# Patient Record
Sex: MALE | Race: WHITE
[De-identification: names, ages, dates, MRNs, and addresses within clinical notes are randomized per-mention and may not be internally consistent; named-entity substitution may affect disease eponyms.]

---

## 2017-04-20 ENCOUNTER — HOSPITAL ENCOUNTER (INPATIENT)
Dept: HOSPITAL 74 - YASAS | Age: 52
LOS: 5 days | Discharge: OTHER INSTITUTION | End: 2017-04-25
Attending: INTERNAL MEDICINE | Admitting: INTERNAL MEDICINE
Payer: COMMERCIAL

## 2017-04-20 VITALS — BODY MASS INDEX: 29.5 KG/M2

## 2017-04-20 DIAGNOSIS — I10: ICD-10-CM
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
## 2017-04-20 DIAGNOSIS — F10.230: Primary | ICD-10-CM

## 2017-04-20 DIAGNOSIS — Z79.84: ICD-10-CM

## 2017-04-20 DIAGNOSIS — E11.9: ICD-10-CM

## 2017-04-20 DIAGNOSIS — S60.511A: ICD-10-CM

## 2017-04-20 DIAGNOSIS — S80.211A: ICD-10-CM
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
## 2017-04-20 RX ADMIN — BACITRACIN ZINC SCH APPLIC: 500 OINTMENT TOPICAL at 22:53

## 2017-04-20 NOTE — HP
CIWA Score





- CIWA Score


Nausea/Vomitin-Mild Nausea/No Vomiting


Muscle Tremors: 3


Anxiety: 4-Mod. Anxious/Guarded


Agitation: 4-Moderately Restless


Paroxysmal Sweats: 2


Orientation: 1-Uncertain about Date


Tacttile Disturbances: 2-Mild Itch/Numbness/Burn (B/L FINDERTIPS AND FEET)


Auditory Disturbances: 1-Very Mild


Visual Disturbances: 1-Very Mild Sensitivity


Headache: 2-Mild


CIWA-Ar Total Score: 21





Admission ROS S





- HPI


Chief Complaint: 


WITHDRAWAL SYMPTOMS 


Allergies/Adverse Reactions: 


 Allergies











Allergy/AdvReac Type Severity Reaction Status Date / Time


 


No Known Allergies Allergy   Verified 17 21:57











History of Present Illness: 


51 Y.O. WITH AN EXTENSIVE HISTORY OF ALCOHOL DEPENDENCE IS SEEKING DETOX.  HE 

WAS LAST HERE FOR DETOX IN 2015.  HE REPORTS HAVING A FOUR YEAR PERIOD OF 

SOBRIETY.   


Exam Limitations: Intoxication, Language Barrier





- Ebola screening


Have you traveled outside of the country in the last 21 days: No


Have you had contact with anyone from an Ebola affected area: No


Do you have a fever: No





- Review of Systems


Constitutional: Loss of Appetite, Night Sweats, Changes in sleep, Unexplained 

wgt Loss


EENT: reports: Blurred Vision, Tearing


Respiratory: reports: Shortness of Breath


Cardiac: reports: Chest Pain


GI: reports: No Symptoms Reported


: reports: Frequency


Musculoskeletal: reports: Back Pain, Muscle Pain, Joint Stiffness


Integumentary: reports: Bruising (ABRASIONS TO RIGHT HAND AND RIGHT KNEE AND 

BRUISE TO LEFT KNEE)


Neuro: reports: Headache, Tingling, Tremors, Unsteady Gait


Endocrine: reports: No Symptoms Reported


Hematology: reports: Anemia


Psychiatric: reports: Mood/Affect Appropiate


Other Systems: Reviewed and Negative





Patient History





- Patient Medical History


Hx Anemia: No


Hx Asthma: No


Hx Chronic Obstructive Pulmonary Disease (COPD): No


Hx Cancer: No


Hx Cardiac Disorders: No


Hx Congestive Heart Failure: No


Hx Hypertension: Yes (no meds)


Hx Hypercholesterolemia: No


Hx Pacemaker: No


HX Cerebrovascular Accident: No


Hx Seizures: No


Hx Dementia: No


Hx Diabetes: Yes (currently not on treatment)


Hx Gastrointestinal Disorders: No


Hx Liver Disease: No


Hx Genitourinary Disorders: No


Hx Sexually Transmitted Disorders: No


Hx Renal Disease (ESRD): No


Hx Thyroid Disease: No


Hx Human Immunodeficiency Virus (HIV): No (2008- neg)


Hx Hepatitis C: No


Hx Depression: No


Hx Suicide Attempt: No


Hx Bipolar Disorder: No


Hx Schizophrenia: No





- Patient Surgical History


Past Surgical History: No


Hx Neurologic Surgery: No


Hx Cataract Extraction: No


Hx Cardiac Surgery: No


Hx Lung Surgery: No


Hx Breast Surgery: No


Hx Breast Biopsy: No


Hx Abdominal Surgery: No


Hx Appendectomy: No


Hx Cholecystectomy: No


Hx Genitourinary Surgery: No


Hx  Section: No


Hx Orthopedic Surgery: No


Hx Hysterectomy: No


Anesthesia Reaction: No





- PPD History


Previous Implant?: Yes


Documented Results: Negative w/proof


Date: 08/07/15


Results: 0 mm


PPD to be Administered?: Yes





- Reproductive History


Patient is a Female of Child Bearing Age (11 -55 yrs old): No





- Smoking Cessation


Smoking history: Never smoked


Have you smoked in the past 12 months: No


Aproximately how many cigarettes per day: 0


Cigars Per Day: 0


Hx Chewing Tobacco Use: No





- Substance & Tx. History


Hx Alcohol Use: Yes


Hx Substance Use: No


Substance Use Type: Alcohol


Hx Substance Use Treatment: Yes (DETOX AND REHAB )





- Substances Abused


  ** Alcohol


Route: Oral


Frequency: Daily


Amount used: 6 PACK OF 12OZ BEER 


Age of first use: 13


Date of Last Use: 17





Family Disease History





- Family Disease History


Family Disease History: Other: Father (ETOH DEPENDENCE )





Admission Physical Exam [facility]





- Vital Signs


Vital Signs: 


 Last Vital Signs











Temp Pulse Resp BP Pulse Ox


 


 97.5 F L  90   17   112/60    


 


 17 21:58  17 21:58  17 21:58  17 21:58   














- Physical


General Appearance: Yes: Disheveled, Alcohol on Breath, Intoxicated, Anxious


HEENTM: Yes: Normal Voice


Respiratory: Yes: Chest Non-Tender, Lungs Clear, Normal Breath Sounds, No 

Respiratory Distress, No Accessory Muscle Use


Neck: Yes: No masses,lesions,Nodules, Trachea in good position


Breast: Yes: Breast Exam Deferred


Cardiology: Yes: Regular Rhythm, Regular Rate


Abdominal: Yes: Non Tender, Flat, Soft


Genitourinary: Yes: Frequency


Back: Yes: Normal Inspection


Musculoskeletal: Yes: Joint Stiffness (RIGHT KNEE)


Extremities: Yes: Tremors


Neurological: Yes: Alert, Normal Response


Integumentary: Yes: Other


Lymphatic: Yes: Within Normal Limits





- Diagnostic


(1) DM Diabetes mellitus type 2


Current Visit: Yes   Status: Chronic





(2) HTN (hypertension)


Current Visit: Yes   Status: Chronic   





(3) Alcohol dependence with uncomplicated withdrawal


Current Visit: Yes   Status: Chronic





(4) Knee abrasion


Current Visit: Yes   Status: Acute   Qualifiers: 


     Laterality: right





(5) Hand abrasion


Current Visit: Yes   Status: Acute   Qualifiers: 


     Laterality: right








Cleared for Admission S





- Detox or Rehab


S Level of Care: Medically Managed


Detox Regimen/Protocol: Librium

## 2017-04-21 LAB
ALBUMIN SERPL-MCNC: 2.7 G/DL (ref 3.4–5)
ALP SERPL-CCNC: 325 U/L (ref 45–117)
ALT SERPL-CCNC: 50 U/L (ref 12–78)
ANION GAP SERPL CALC-SCNC: 11 MMOL/L (ref 8–16)
AST SERPL-CCNC: 141 U/L (ref 15–37)
BILIRUB SERPL-MCNC: 1 MG/DL (ref 0.2–1)
CALCIUM SERPL-MCNC: 7.8 MG/DL (ref 8.5–10.1)
CO2 SERPL-SCNC: 25 MMOL/L (ref 21–32)
COCKROFT - GAULT: 190.71
CREAT SERPL-MCNC: 0.5 MG/DL (ref 0.7–1.3)
DEPRECATED RDW RBC AUTO: 17.9 % (ref 11.9–15.9)
GLUCOSE SERPL-MCNC: 172 MG/DL (ref 74–106)
MCH RBC QN AUTO: 29.3 PG (ref 25.7–33.7)
MCHC RBC AUTO-ENTMCNC: 32.6 G/DL (ref 32–35.9)
MCV RBC: 89.9 FL (ref 80–96)
PLATELET # BLD AUTO: 36 K/MM3 (ref 134–434)
PMV BLD: 9.4 FL (ref 7.5–11.1)
PROT SERPL-MCNC: 7.7 G/DL (ref 6.4–8.2)
WBC # BLD AUTO: 5.8 K/MM3 (ref 4–10)

## 2017-04-21 RX ADMIN — METFORMIN HYDROCHLORIDE SCH MG: 500 TABLET ORAL at 17:17

## 2017-04-21 RX ADMIN — BACITRACIN ZINC SCH APPLIC: 500 OINTMENT TOPICAL at 10:06

## 2017-04-21 RX ADMIN — INSULIN ASPART SCH UNITS: 100 INJECTION, SOLUTION INTRAVENOUS; SUBCUTANEOUS at 17:17

## 2017-04-21 RX ADMIN — Medication SCH TAB: at 10:06

## 2017-04-21 RX ADMIN — Medication SCH MG: at 22:14

## 2017-04-21 RX ADMIN — INSULIN ASPART SCH UNITS: 100 INJECTION, SOLUTION INTRAVENOUS; SUBCUTANEOUS at 06:52

## 2017-04-21 RX ADMIN — METFORMIN HYDROCHLORIDE SCH MG: 500 TABLET ORAL at 06:51

## 2017-04-21 RX ADMIN — ENALAPRIL MALEATE SCH MG: 5 TABLET ORAL at 10:06

## 2017-04-21 RX ADMIN — BACITRACIN ZINC SCH: 500 OINTMENT TOPICAL at 23:13

## 2017-04-21 NOTE — EKG
Test Reason : 

Blood Pressure : ***/*** mmHG

Vent. Rate : 074 BPM     Atrial Rate : 074 BPM

   P-R Int : 168 ms          QRS Dur : 102 ms

    QT Int : 410 ms       P-R-T Axes : 004 053 017 degrees

   QTc Int : 455 ms

 

NORMAL SINUS RHYTHM

NON-SPECIFIC INTRA-VENTRICULAR CONDUCTION DELAY

Confirmed by OLY SCHULTZ MD ([id number]) on 4/21/2017 11:23:38 AM

 

Referred By:             Confirmed By:OLY SCHULTZ MD

## 2017-04-21 NOTE — PN
S CIWA





- CIWA Score


Nausea/Vomitin-No Nausea/No Vomiting


Muscle Tremors: 5


Anxiety: 4-Mod. Anxious/Guarded


Agitation: 4-Moderately Restless


Paroxysmal Sweats: 1-Minimal Palms Moist


Orientation: 0-Oriented


Tacttile Disturbances: 3-Moderate Itch/Numb/Burn


Auditory Disturbances: 0-None


Visual Disturbances: 0-None


Headache: 0-None Present


CIWA-Ar Total Score: 17





[facility] Progress Note (SOAP)


Subjective: 


TREMORS,SWEATS,INTERMITTENT SLEEP


Objective: 


17 10:44


 Vital Signs











Temperature  97.7 F   17 09:58


 


Pulse Rate  101 H  17 09:58


 


Respiratory Rate  18   17 09:58


 


Blood Pressure  130/73   17 09:58


 


O2 Sat by Pulse Oximetry (%)      








 Laboratory Last Values











POC Glucometer  255 UNITS (())   17  05:49    














LABS PENDING.





Assessment: 





17 10:45


WITHDRAWAL SX


Plan: 


CONTINUE DETOX

## 2017-04-22 LAB
ALBUMIN SERPL-MCNC: 2.8 G/DL (ref 3.4–5)
ALP SERPL-CCNC: 333 U/L (ref 45–117)
ALT SERPL-CCNC: 56 U/L (ref 12–78)
ANION GAP SERPL CALC-SCNC: 9 MMOL/L (ref 8–16)
AST SERPL-CCNC: 165 U/L (ref 15–37)
BILIRUB SERPL-MCNC: 2.7 MG/DL (ref 0.2–1)
CALCIUM SERPL-MCNC: 8.5 MG/DL (ref 8.5–10.1)
CO2 SERPL-SCNC: 25 MMOL/L (ref 21–32)
COCKROFT - GAULT: 190.71
CREAT SERPL-MCNC: 0.5 MG/DL (ref 0.7–1.3)
DEPRECATED RDW RBC AUTO: 17.3 % (ref 11.9–15.9)
GLUCOSE SERPL-MCNC: 185 MG/DL (ref 74–106)
INR BLD: 1.22 (ref 0.82–1.09)
MCH RBC QN AUTO: 29.5 PG (ref 25.7–33.7)
MCHC RBC AUTO-ENTMCNC: 33.1 G/DL (ref 32–35.9)
MCV RBC: 89.1 FL (ref 80–96)
PLATELET # BLD AUTO: 32 K/MM3 (ref 134–434)
PMV BLD: 9.6 FL (ref 7.5–11.1)
PROT SERPL-MCNC: 8.2 G/DL (ref 6.4–8.2)
PT PNL PPP: 13.5 SEC (ref 9.98–11.88)
WBC # BLD AUTO: 6.7 K/MM3 (ref 4–10)

## 2017-04-22 RX ADMIN — BACITRACIN ZINC SCH APPLIC: 500 OINTMENT TOPICAL at 10:14

## 2017-04-22 RX ADMIN — Medication SCH TAB: at 10:13

## 2017-04-22 RX ADMIN — POTASSIUM CHLORIDE SCH MEQ: 1500 TABLET, EXTENDED RELEASE ORAL at 22:15

## 2017-04-22 RX ADMIN — METFORMIN HYDROCHLORIDE SCH MG: 500 TABLET ORAL at 17:27

## 2017-04-22 RX ADMIN — Medication SCH MG: at 22:15

## 2017-04-22 RX ADMIN — INSULIN ASPART SCH UNITS: 100 INJECTION, SOLUTION INTRAVENOUS; SUBCUTANEOUS at 06:12

## 2017-04-22 RX ADMIN — BACITRACIN ZINC SCH APPLIC: 500 OINTMENT TOPICAL at 22:15

## 2017-04-22 RX ADMIN — INSULIN ASPART SCH UNITS: 100 INJECTION, SOLUTION INTRAVENOUS; SUBCUTANEOUS at 17:27

## 2017-04-22 RX ADMIN — POTASSIUM CHLORIDE SCH: 1500 TABLET, EXTENDED RELEASE ORAL at 10:14

## 2017-04-22 RX ADMIN — METFORMIN HYDROCHLORIDE SCH MG: 500 TABLET ORAL at 06:11

## 2017-04-22 RX ADMIN — ENALAPRIL MALEATE SCH MG: 5 TABLET ORAL at 10:13

## 2017-04-22 NOTE — PN
S CIWA





- CIWA Score


Nausea/Vomitin


Muscle Tremors: 3


Anxiety: 2


Agitation: 2


Paroxysmal Sweats: 2


Orientation: 0-Oriented


Tacttile Disturbances: 1-Very Mild Itch/Numbness


Auditory Disturbances: 0-None


Visual Disturbances: 1-Very Mild Sensitivity


Headache: 2-Mild


CIWA-Ar Total Score: 15





[facility] Progress Note (SOAP)


Subjective: 


shakes, sweats, sleep interruption


Objective: 





17 13:04


 Vital Signs - 8 hr











  17





  06:45 09:45


 


Temperature 98.4 F 96.4 F L


 


Pulse Rate 95 H 111 H


 


Respiratory 16 18





Rate  


 


Blood Pressure 130/83 118/79








 Laboratory Last Values











WBC  6.7 K/mm3 (4.0-10.0)   17  07:50    


 


RBC  4.55 M/mm3 (4.00-5.60)   17  07:50    


 


Hgb  13.4 GM/dL (11.7-16.9)   17  07:50    


 


Hct  40.5 % (35.4-49)   17  07:50    


 


MCV  89.1 fl (80-96)   17  07:50    


 


MCHC  33.1 g/dl (32.0-35.9)   17  07:50    


 


RDW  17.3 % (11.9-15.9)  H  17  07:50    


 


Plt Count  32 K/MM3 (134-434)  L*  17  07:50    


 


MPV  9.6 fl (7.5-11.1)   17  07:50    


 


INR  1.22  (0.82-1.09)  H  17  07:50    


 


Sodium  135 mmol/L (136-145)  L  17  07:50    


 


Potassium  3.7 mmol/L (3.5-5.1)   17  07:50    


 


Chloride  101 mmol/L ()   17  07:50    


 


Carbon Dioxide  25 mmol/L (21-32)   17  07:50    


 


Anion Gap  9  (8-16)   17  07:50    


 


BUN  7 mg/dL (7-18)  D 17  07:50    


 


Creatinine  0.5 mg/dL (0.7-1.3)  L  17  07:50    


 


Creat Clearance w eGFR  > 60  (>60)   17  07:50    


 


POC Glucometer  183 UNITS (())   17  05:19    


 


Random Glucose  185 mg/dL ()  H  17  07:50    


 


Calcium  8.5 mg/dL (8.5-10.1)   17  07:50    


 


Total Bilirubin  2.7 mg/dL (0.2-1.0)  H D 17  07:50    


 


AST  165 U/L (15-37)  H  17  07:50    


 


ALT  56 U/L (12-78)   17  07:50    


 


Alkaline Phosphatase  333 U/L ()  H  17  07:50    


 


Total Protein  8.2 g/dl (6.4-8.2)   17  07:50    


 


Albumin  2.8 g/dl (3.4-5.0)  L  17  07:50    


 


RPR Titer  Nonreactive  (NONREACTIVE)   17  07:00    








Labs noted, platelets decreased but not critically, no signs and symptoms of 

bleeding, VSS


Assessment: 





17 13:05


withdrawal sx


possible ITP


Plan: 


continue detox


monitor for bleeding, f/u with PCP upon discharge, transfer to ED if bleeding 

occurs

## 2017-04-22 NOTE — PN
[facility] Progress Note


Note: 


 Laboratory Last Values











WBC  5.8 K/mm3 (4.0-10.0)  D 04/21/17  07:00    


 


RBC  4.16 M/mm3 (4.00-5.60)   04/21/17  07:00    


 


Hgb  12.2 GM/dL (11.7-16.9)   04/21/17  07:00    


 


Hct  37.4 % (35.4-49)   04/21/17  07:00    


 


MCV  89.9 fl (80-96)   04/21/17  07:00    


 


MCHC  32.6 g/dl (32.0-35.9)   04/21/17  07:00    


 


RDW  17.9 % (11.9-15.9)  H D 04/21/17  07:00    


 


Plt Count  36 K/MM3 (134-434)  L* D 04/21/17  07:00    


 


MPV  9.4 fl (7.5-11.1)   04/21/17  07:00    


 


Sodium  141 mmol/L (136-145)   04/21/17  07:00    


 


Potassium  3.2 mmol/L (3.5-5.1)  L  04/21/17  07:00    


 


Chloride  105 mmol/L ()   04/21/17  07:00    


 


Carbon Dioxide  25 mmol/L (21-32)   04/21/17  07:00    


 


Anion Gap  11  (8-16)   04/21/17  07:00    


 


BUN  5 mg/dL (7-18)  L D 04/21/17  07:00    


 


Creatinine  0.5 mg/dL (0.7-1.3)  L  04/21/17  07:00    


 


Creat Clearance w eGFR  > 60  (>60)   04/21/17  07:00    


 


POC Glucometer  183 UNITS (())   04/22/17  05:19    


 


Random Glucose  172 mg/dL ()  H D 04/21/17  07:00    


 


Calcium  7.8 mg/dL (8.5-10.1)  L  04/21/17  07:00    


 


Total Bilirubin  1.0 mg/dL (0.2-1.0)  D 04/21/17  07:00    


 


AST  141 U/L (15-37)  H  04/21/17  07:00    


 


ALT  50 U/L (12-78)   04/21/17  07:00    


 


Alkaline Phosphatase  325 U/L ()  H  04/21/17  07:00    


 


Total Protein  7.7 g/dl (6.4-8.2)   04/21/17  07:00    


 


Albumin  2.7 g/dl (3.4-5.0)  L  04/21/17  07:00    


 


RPR Titer  Nonreactive  (NONREACTIVE)   04/21/17  07:00    








hypokalemia k 3.2,plaelet 36k,glucose 172,ast 141


k dur 20 meq po bid then bid.repeat cbc,cmp,inr  today


continue detox

## 2017-04-23 LAB
APPEARANCE UR: CLEAR
BILIRUB UR STRIP.AUTO-MCNC: NEGATIVE MG/DL
COLOR UR: (no result)
KETONES UR QL STRIP: NEGATIVE
LEUKOCYTE ESTERASE UR QL STRIP.AUTO: NEGATIVE
MUCOUS THREADS URNS QL MICRO: (no result)
NITRITE UR QL STRIP: NEGATIVE
PH UR: 5 [PH] (ref 5–8)
PROT UR QL STRIP: (no result)
PROT UR QL STRIP: NEGATIVE
RBC # BLD AUTO: 19 /HPF (ref 0–3)
RBC # UR STRIP: (no result) /UL
SP GR UR: 1.03 (ref 1–1.03)
UROBILINOGEN UR STRIP-MCNC: (no result) E.U./DL (ref 0.2–1)
WBC # UR AUTO: 2 /HPF (ref 3–5)
YEAST #/AREA URNS HPF: (no result) /[HPF]

## 2017-04-23 RX ADMIN — Medication SCH TAB: at 10:13

## 2017-04-23 RX ADMIN — POTASSIUM CHLORIDE SCH MEQ: 1500 TABLET, EXTENDED RELEASE ORAL at 22:26

## 2017-04-23 RX ADMIN — BACITRACIN ZINC SCH APPLIC: 500 OINTMENT TOPICAL at 10:13

## 2017-04-23 RX ADMIN — BACITRACIN ZINC SCH APPLIC: 500 OINTMENT TOPICAL at 22:26

## 2017-04-23 RX ADMIN — Medication SCH MG: at 22:26

## 2017-04-23 RX ADMIN — INSULIN ASPART SCH UNITS: 100 INJECTION, SOLUTION INTRAVENOUS; SUBCUTANEOUS at 17:21

## 2017-04-23 RX ADMIN — METFORMIN HYDROCHLORIDE SCH MG: 500 TABLET ORAL at 07:24

## 2017-04-23 RX ADMIN — METFORMIN HYDROCHLORIDE SCH MG: 500 TABLET ORAL at 17:21

## 2017-04-23 RX ADMIN — POTASSIUM CHLORIDE SCH MEQ: 1500 TABLET, EXTENDED RELEASE ORAL at 10:13

## 2017-04-23 RX ADMIN — ENALAPRIL MALEATE SCH MG: 5 TABLET ORAL at 10:13

## 2017-04-23 RX ADMIN — INSULIN ASPART SCH UNITS: 100 INJECTION, SOLUTION INTRAVENOUS; SUBCUTANEOUS at 07:29

## 2017-04-23 NOTE — PN
[facility] Progress Note (SOAP)


Subjective: 


Diarrhea, sweating, anxious, interrupted sleep


Objective: 


04/23/17 15:37


 Last Vital Signs











Temp Pulse Resp BP Pulse Ox


 


 96.5 F L  107 H  18   112/74    


 


 04/23/17 13:22  04/23/17 13:22  04/23/17 13:22  04/23/17 13:22   








 Laboratory Tests











  04/20/17 04/21/17 04/21/17





  21:53 05:49 07:00


 


WBC    5.8  D


 


RBC    4.16


 


Hgb    12.2


 


Hct    37.4


 


MCV    89.9


 


MCHC    32.6


 


RDW    17.9 H D


 


Plt Count    36 L* D


 


MPV    9.4


 


INR   


 


Sodium   


 


Potassium   


 


Chloride   


 


Carbon Dioxide   


 


Anion Gap   


 


BUN   


 


Creatinine   


 


Creat Clearance w eGFR   


 


POC Glucometer  326  255 


 


Random Glucose   


 


Calcium   


 


Total Bilirubin   


 


AST   


 


ALT   


 


Alkaline Phosphatase   


 


Total Protein   


 


Albumin   


 


RPR Titer   














  04/21/17 04/21/17 04/21/17





  07:00 07:00 16:22


 


WBC   


 


RBC   


 


Hgb   


 


Hct   


 


MCV   


 


MCHC   


 


RDW   


 


Plt Count   


 


MPV   


 


INR   


 


Sodium  141  


 


Potassium  3.2 L  


 


Chloride  105  


 


Carbon Dioxide  25  


 


Anion Gap  11  


 


BUN  5 L D  


 


Creatinine  0.5 L  


 


Creat Clearance w eGFR  > 60  


 


POC Glucometer    213


 


Random Glucose  172 H D  


 


Calcium  7.8 L  


 


Total Bilirubin  1.0  D  


 


AST  141 H  


 


ALT  50  


 


Alkaline Phosphatase  325 H  


 


Total Protein  7.7  


 


Albumin  2.7 L  


 


RPR Titer   Nonreactive 














  04/22/17 04/22/17 04/22/17





  05:19 07:50 07:50


 


WBC   6.7 


 


RBC   4.55 


 


Hgb   13.4 


 


Hct   40.5 


 


MCV   89.1 


 


MCHC   33.1 


 


RDW   17.3 H 


 


Plt Count   32 L* 


 


MPV   9.6 


 


INR    1.22 H


 


Sodium   


 


Potassium   


 


Chloride   


 


Carbon Dioxide   


 


Anion Gap   


 


BUN   


 


Creatinine   


 


Creat Clearance w eGFR   


 


POC Glucometer  183  


 


Random Glucose   


 


Calcium   


 


Total Bilirubin   


 


AST   


 


ALT   


 


Alkaline Phosphatase   


 


Total Protein   


 


Albumin   


 


RPR Titer   














  04/22/17 04/22/17 04/23/17





  07:50 16:25 05:56


 


WBC   


 


RBC   


 


Hgb   


 


Hct   


 


MCV   


 


MCHC   


 


RDW   


 


Plt Count   


 


MPV   


 


INR   


 


Sodium  135 L  


 


Potassium  3.7  


 


Chloride  101  


 


Carbon Dioxide  25  


 


Anion Gap  9  


 


BUN  7  D  


 


Creatinine  0.5 L  


 


Creat Clearance w eGFR  > 60  


 


POC Glucometer   272  169


 


Random Glucose  185 H  


 


Calcium  8.5  


 


Total Bilirubin  2.7 H D  


 


AST  165 H  


 


ALT  56  


 


Alkaline Phosphatase  333 H  


 


Total Protein  8.2  


 


Albumin  2.8 L  


 


RPR Titer   








Labs reviewed: platelets 32 was 36


Assessment: 


04/23/17 15:38


Withdrawal symptoms





Noted with thrombocytopenia


Plan: 


Continue detox





Thrombocytopenia: monitor for bruising/bleeding, follow up with PCP or 

Hematologist for monitoring/management

## 2017-04-24 RX ADMIN — Medication SCH MG: at 21:36

## 2017-04-24 RX ADMIN — BACITRACIN ZINC SCH: 500 OINTMENT TOPICAL at 21:37

## 2017-04-24 RX ADMIN — POTASSIUM CHLORIDE SCH MEQ: 1500 TABLET, EXTENDED RELEASE ORAL at 21:36

## 2017-04-24 RX ADMIN — INSULIN ASPART SCH UNITS: 100 INJECTION, SOLUTION INTRAVENOUS; SUBCUTANEOUS at 17:24

## 2017-04-24 RX ADMIN — Medication SCH TAB: at 10:09

## 2017-04-24 RX ADMIN — METFORMIN HYDROCHLORIDE SCH MG: 500 TABLET ORAL at 17:23

## 2017-04-24 RX ADMIN — ENALAPRIL MALEATE SCH MG: 5 TABLET ORAL at 10:09

## 2017-04-24 RX ADMIN — INSULIN ASPART SCH UNITS: 100 INJECTION, SOLUTION INTRAVENOUS; SUBCUTANEOUS at 07:41

## 2017-04-24 RX ADMIN — METFORMIN HYDROCHLORIDE SCH MG: 500 TABLET ORAL at 07:41

## 2017-04-24 RX ADMIN — POTASSIUM CHLORIDE SCH MEQ: 1500 TABLET, EXTENDED RELEASE ORAL at 10:09

## 2017-04-24 RX ADMIN — BACITRACIN ZINC SCH: 500 OINTMENT TOPICAL at 10:09

## 2017-04-24 NOTE — PN
[facility] Progress Note (SOAP)


Subjective: 


Sweating,interrupted sleep,restless


Objective: 





04/24/17 15:28


 Vital Signs - 8 hr











  04/24/17





  14:03


 


Temperature 96.0 F L


 


Pulse Rate 92 H


 


Respiratory 20





Rate 


 


Blood Pressure 106/66








 Laboratory Last Values











WBC  6.7 K/mm3 (4.0-10.0)   04/22/17  07:50    


 


RBC  4.55 M/mm3 (4.00-5.60)   04/22/17  07:50    


 


Hgb  13.4 GM/dL (11.7-16.9)   04/22/17  07:50    


 


Hct  40.5 % (35.4-49)   04/22/17  07:50    


 


MCV  89.1 fl (80-96)   04/22/17  07:50    


 


MCHC  33.1 g/dl (32.0-35.9)   04/22/17  07:50    


 


RDW  17.3 % (11.9-15.9)  H  04/22/17  07:50    


 


Plt Count  32 K/MM3 (134-434)  L*  04/22/17  07:50    


 


MPV  9.6 fl (7.5-11.1)   04/22/17  07:50    


 


INR  1.22  (0.82-1.09)  H  04/22/17  07:50    


 


Sodium  135 mmol/L (136-145)  L  04/22/17  07:50    


 


Potassium  3.7 mmol/L (3.5-5.1)   04/22/17  07:50    


 


Chloride  101 mmol/L ()   04/22/17  07:50    


 


Carbon Dioxide  25 mmol/L (21-32)   04/22/17  07:50    


 


Anion Gap  9  (8-16)   04/22/17  07:50    


 


BUN  7 mg/dL (7-18)  D 04/22/17  07:50    


 


Creatinine  0.5 mg/dL (0.7-1.3)  L  04/22/17  07:50    


 


Creat Clearance w eGFR  > 60  (>60)   04/22/17  07:50    


 


POC Glucometer  186 UNITS (())   04/24/17  06:02    


 


Random Glucose  185 mg/dL ()  H  04/22/17  07:50    


 


Calcium  8.5 mg/dL (8.5-10.1)   04/22/17  07:50    


 


Total Bilirubin  2.7 mg/dL (0.2-1.0)  H D 04/22/17  07:50    


 


AST  165 U/L (15-37)  H  04/22/17  07:50    


 


ALT  56 U/L (12-78)   04/22/17  07:50    


 


Alkaline Phosphatase  333 U/L ()  H  04/22/17  07:50    


 


Total Protein  8.2 g/dl (6.4-8.2)   04/22/17  07:50    


 


Albumin  2.8 g/dl (3.4-5.0)  L  04/22/17  07:50    


 


Urine Color  [place]   04/23/17  14:25    


 


Urine Appearance  Clear   04/23/17  14:25    


 


Urine pH  5.0  (5.0-8.0)  D 04/23/17  14:25    


 


Ur Specific Gravity  1.029  (1.001-1.035)   04/23/17  14:25    


 


Urine Protein  Negative  (NEGATIVE)   04/23/17  14:25    


 


Urine Glucose (UA)  3+  (NEGATIVE)  H  04/23/17  14:25    


 


Urine Ketones  Negative  (NEGATIVE)   04/23/17  14:25    


 


Urine Blood  2+  (NEGATIVE)  H  04/23/17  14:25    


 


Urine Nitrite  Negative  (NEGATIVE)   04/23/17  14:25    


 


Urine Bilirubin  Negative  (NEGATIVE)   04/23/17  14:25    


 


Urine Urobilinogen  2.0 e.u/dl E.U./dl (0.2-1.0)   04/23/17  14:25    


 


Ur Leukocyte Esterase  Negative  (NEGATIVE)   04/23/17  14:25    


 


Urine RBC  19 /hpf (0-3)   04/23/17  14:25    


 


Urine WBC  2 /hpf (3-5)   04/23/17  14:25    


 


Ur Epithelial Cells  Rare /hpf (FEW)   04/23/17  14:25    


 


Urine Mucus  Rare   04/23/17  14:25    


 


Urine Yeast  Rare   04/23/17  14:25    


 


RPR Titer  Nonreactive  (NONREACTIVE)   04/21/17  07:00    








labs noted


Assessment: 





04/24/17 15:29


Withdrawal sx.


Plan: 


Continue detox

## 2017-04-25 ENCOUNTER — HOSPITAL ENCOUNTER (INPATIENT)
Dept: HOSPITAL 74 - YASAS | Age: 52
LOS: 27 days | Discharge: HOME | DRG: 772 | End: 2017-05-22
Attending: PSYCHIATRY & NEUROLOGY | Admitting: PSYCHIATRY & NEUROLOGY
Payer: COMMERCIAL

## 2017-04-25 VITALS — DIASTOLIC BLOOD PRESSURE: 68 MMHG | SYSTOLIC BLOOD PRESSURE: 106 MMHG | HEART RATE: 106 BPM | TEMPERATURE: 98.9 F

## 2017-04-25 VITALS — BODY MASS INDEX: 28.6 KG/M2

## 2017-04-25 DIAGNOSIS — E11.9: ICD-10-CM

## 2017-04-25 DIAGNOSIS — F10.20: Primary | ICD-10-CM

## 2017-04-25 DIAGNOSIS — F17.210: ICD-10-CM

## 2017-04-25 DIAGNOSIS — F19.282: ICD-10-CM

## 2017-04-25 DIAGNOSIS — I10: ICD-10-CM

## 2017-04-25 PROCEDURE — HZ2ZZZZ DETOXIFICATION SERVICES FOR SUBSTANCE ABUSE TREATMENT: ICD-10-PCS | Performed by: INTERNAL MEDICINE

## 2017-04-25 PROCEDURE — HZ42ZZZ GROUP COUNSELING FOR SUBSTANCE ABUSE TREATMENT, COGNITIVE-BEHAVIORAL: ICD-10-PCS | Performed by: PSYCHIATRY & NEUROLOGY

## 2017-04-25 RX ADMIN — BACITRACIN ZINC SCH GM: 500 OINTMENT TOPICAL at 21:50

## 2017-04-25 RX ADMIN — Medication SCH TAB: at 10:31

## 2017-04-25 RX ADMIN — METFORMIN HYDROCHLORIDE SCH MG: 500 TABLET ORAL at 16:48

## 2017-04-25 RX ADMIN — INSULIN ASPART SCH UNITS: 100 INJECTION, SOLUTION INTRAVENOUS; SUBCUTANEOUS at 07:30

## 2017-04-25 RX ADMIN — ENALAPRIL MALEATE SCH MG: 5 TABLET ORAL at 10:31

## 2017-04-25 RX ADMIN — POTASSIUM CHLORIDE SCH MEQ: 1500 TABLET, EXTENDED RELEASE ORAL at 10:31

## 2017-04-25 RX ADMIN — METFORMIN HYDROCHLORIDE SCH MG: 500 TABLET ORAL at 06:04

## 2017-04-25 RX ADMIN — INSULIN ASPART SCH UNITS: 100 INJECTION, SOLUTION INTRAVENOUS; SUBCUTANEOUS at 16:51

## 2017-04-25 RX ADMIN — BACITRACIN ZINC SCH: 500 OINTMENT TOPICAL at 10:31

## 2017-04-25 RX ADMIN — Medication SCH MG: at 21:49
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
## 2017-04-25 NOTE — HP
[facility] MD Rehab Assess/Revision





- Admission History


Ad[facility] to Rehab from: [facility]


Date of Admission to Rehab: 04/25/17





- Vital signs


Vital Signs: 


 Vital Signs











 Period  Temp  Pulse  Resp  BP Sys/Licea  Pulse Ox


 


 Last 24 Hr  96.8 F  90  18  101/67  














- Findings


Detox History & Physical reviewed: Yes


Concur with findings: Yes


Comments/Additional Findings: transferred from detox to rehab admission as per 

protocol

## 2017-04-25 NOTE — DS
[facility] Detox Discharge Summary


Admission Date: 


04/20/17





Discharge Date: 04/25/17





- History


Present History: Alcohol Dependence


Additional Comments: 


DETOX COMPLETED. ALERT O X 3. NAD.


Pertinent Past History: 


HTN


DM





- Physical Exam Results


Vital Signs: 


 Vital Signs











Temperature  98.9 F   04/25/17 06:29


 


Pulse Rate  106 H  04/25/17 06:29


 


Respiratory Rate  18   04/25/17 06:29


 


Blood Pressure  106/68   04/25/17 06:29


 


O2 Sat by Pulse Oximetry (%)      











Pertinent Admission Physical Exam Findings: 


WITHDRAWAL SX


 Laboratory Last Values











WBC  6.7 K/mm3 (4.0-10.0)   04/22/17  07:50    


 


RBC  4.55 M/mm3 (4.00-5.60)   04/22/17  07:50    


 


Hgb  13.4 GM/dL (11.7-16.9)   04/22/17  07:50    


 


Hct  40.5 % (35.4-49)   04/22/17  07:50    


 


MCV  89.1 fl (80-96)   04/22/17  07:50    


 


MCHC  33.1 g/dl (32.0-35.9)   04/22/17  07:50    


 


RDW  17.3 % (11.9-15.9)  H  04/22/17  07:50    


 


Plt Count  32 K/MM3 (134-434)  L*  04/22/17  07:50    


 


MPV  9.6 fl (7.5-11.1)   04/22/17  07:50    


 


INR  1.22  (0.82-1.09)  H  04/22/17  07:50    


 


Sodium  135 mmol/L (136-145)  L  04/22/17  07:50    


 


Potassium  3.7 mmol/L (3.5-5.1)   04/22/17  07:50    


 


Chloride  101 mmol/L ()   04/22/17  07:50    


 


Carbon Dioxide  25 mmol/L (21-32)   04/22/17  07:50    


 


Anion Gap  9  (8-16)   04/22/17  07:50    


 


BUN  7 mg/dL (7-18)  D 04/22/17  07:50    


 


Creatinine  0.5 mg/dL (0.7-1.3)  L  04/22/17  07:50    


 


Creat Clearance w eGFR  > 60  (>60)   04/22/17  07:50    


 


POC Glucometer  278 UNITS (())   04/25/17  05:43    


 


Random Glucose  185 mg/dL ()  H  04/22/17  07:50    


 


Calcium  8.5 mg/dL (8.5-10.1)   04/22/17  07:50    


 


Total Bilirubin  2.7 mg/dL (0.2-1.0)  H D 04/22/17  07:50    


 


AST  165 U/L (15-37)  H  04/22/17  07:50    


 


ALT  56 U/L (12-78)   04/22/17  07:50    


 


Alkaline Phosphatase  333 U/L ()  H  04/22/17  07:50    


 


Total Protein  8.2 g/dl (6.4-8.2)   04/22/17  07:50    


 


Albumin  2.8 g/dl (3.4-5.0)  L  04/22/17  07:50    


 


Urine Color  [place]   04/23/17  14:25    


 


Urine Appearance  Clear   04/23/17  14:25    


 


Urine pH  5.0  (5.0-8.0)  D 04/23/17  14:25    


 


Ur Specific Gravity  1.029  (1.001-1.035)   04/23/17  14:25    


 


Urine Protein  Negative  (NEGATIVE)   04/23/17  14:25    


 


Urine Glucose (UA)  3+  (NEGATIVE)  H  04/23/17  14:25    


 


Urine Ketones  Negative  (NEGATIVE)   04/23/17  14:25    


 


Urine Blood  2+  (NEGATIVE)  H  04/23/17  14:25    


 


Urine Nitrite  Negative  (NEGATIVE)   04/23/17  14:25    


 


Urine Bilirubin  Negative  (NEGATIVE)   04/23/17  14:25    


 


Urine Urobilinogen  2.0 e.u/dl E.U./dl (0.2-1.0)   04/23/17  14:25    


 


Ur Leukocyte Esterase  Negative  (NEGATIVE)   04/23/17  14:25    


 


Urine RBC  19 /hpf (0-3)   04/23/17  14:25    


 


Urine WBC  2 /hpf (3-5)   04/23/17  14:25    


 


Ur Epithelial Cells  Rare /hpf (FEW)   04/23/17  14:25    


 


Urine Mucus  Rare   04/23/17  14:25    


 


Urine Yeast  Rare   04/23/17  14:25    


 


RPR Titer  Nonreactive  (NONREACTIVE)   04/21/17  07:00    














- Treatment


Hospital Course: Detox Protocol Followed, Detoxed Safely, Responded well, 

Discharged Condition Good, Rehab Referral Accepted


Patient has Accepted a Rehab Referral to: [address]





- Medication


Discharge Medications: 


Ambulatory Orders





Enalapril Maleate [Vasotec -] 5 mg PO DAILY #30 tablet 08/10/15 


Metformin HCl [Glucophage -] 1,000 mg PO BID@0700,1630 #60 tablet 08/10/15 











- Diagnosis


(1) Alcohol dependence with uncomplicated withdrawal


Current Visit: Yes   Status: Acute





(2) DM Diabetes mellitus type 2


Current Visit: Yes   Status: Chronic





(3) HTN (hypertension)


Current Visit: Yes   Status: Chronic   








- AMA


Did Patient Leave Against Medical Advice: No

## 2017-04-26 LAB
HIV 1 & 2 AB: NEGATIVE
HIV 1 AGP24: NEGATIVE

## 2017-04-26 RX ADMIN — ENALAPRIL MALEATE SCH MG: 5 TABLET ORAL at 09:44

## 2017-04-26 RX ADMIN — METFORMIN HYDROCHLORIDE SCH MG: 500 TABLET ORAL at 16:50

## 2017-04-26 RX ADMIN — BACITRACIN ZINC SCH GM: 500 OINTMENT TOPICAL at 21:25

## 2017-04-26 RX ADMIN — INSULIN ASPART SCH UNITS: 100 INJECTION, SOLUTION INTRAVENOUS; SUBCUTANEOUS at 16:51

## 2017-04-26 RX ADMIN — INSULIN ASPART SCH UNITS: 100 INJECTION, SOLUTION INTRAVENOUS; SUBCUTANEOUS at 07:03

## 2017-04-26 RX ADMIN — Medication SCH MG: at 21:25

## 2017-04-26 RX ADMIN — Medication SCH TAB: at 09:44

## 2017-04-26 RX ADMIN — BACITRACIN ZINC SCH GM: 500 OINTMENT TOPICAL at 09:44

## 2017-04-26 RX ADMIN — METFORMIN HYDROCHLORIDE SCH MG: 500 TABLET ORAL at 07:02

## 2017-04-27 RX ADMIN — INSULIN ASPART SCH UNITS: 100 INJECTION, SOLUTION INTRAVENOUS; SUBCUTANEOUS at 06:20

## 2017-04-27 RX ADMIN — Medication SCH TAB: at 09:56

## 2017-04-27 RX ADMIN — ENALAPRIL MALEATE SCH MG: 5 TABLET ORAL at 09:56

## 2017-04-27 RX ADMIN — METFORMIN HYDROCHLORIDE SCH MG: 500 TABLET ORAL at 16:42

## 2017-04-27 RX ADMIN — METFORMIN HYDROCHLORIDE SCH MG: 500 TABLET ORAL at 06:20

## 2017-04-27 RX ADMIN — Medication SCH MG: at 21:13

## 2017-04-27 RX ADMIN — INSULIN ASPART SCH UNITS: 100 INJECTION, SOLUTION INTRAVENOUS; SUBCUTANEOUS at 16:43

## 2017-04-27 RX ADMIN — BACITRACIN ZINC SCH GM: 500 OINTMENT TOPICAL at 09:57

## 2017-04-27 RX ADMIN — BACITRACIN ZINC SCH: 500 OINTMENT TOPICAL at 21:13

## 2017-04-28 RX ADMIN — Medication SCH MG: at 21:38

## 2017-04-28 RX ADMIN — Medication SCH TAB: at 09:47

## 2017-04-28 RX ADMIN — METFORMIN HYDROCHLORIDE SCH MG: 500 TABLET ORAL at 07:24

## 2017-04-28 RX ADMIN — BACITRACIN ZINC SCH: 500 OINTMENT TOPICAL at 21:38

## 2017-04-28 RX ADMIN — INSULIN ASPART SCH UNITS: 100 INJECTION, SOLUTION INTRAVENOUS; SUBCUTANEOUS at 07:24

## 2017-04-28 RX ADMIN — INSULIN ASPART SCH UNITS: 100 INJECTION, SOLUTION INTRAVENOUS; SUBCUTANEOUS at 16:58

## 2017-04-28 RX ADMIN — METFORMIN HYDROCHLORIDE SCH MG: 500 TABLET ORAL at 16:57

## 2017-04-28 RX ADMIN — BACITRACIN ZINC SCH: 500 OINTMENT TOPICAL at 10:46

## 2017-04-28 RX ADMIN — ENALAPRIL MALEATE SCH MG: 5 TABLET ORAL at 09:47

## 2017-04-29 RX ADMIN — Medication SCH MG: at 21:42

## 2017-04-29 RX ADMIN — ENALAPRIL MALEATE SCH MG: 5 TABLET ORAL at 09:51

## 2017-04-29 RX ADMIN — INSULIN ASPART SCH UNITS: 100 INJECTION, SOLUTION INTRAVENOUS; SUBCUTANEOUS at 16:53

## 2017-04-29 RX ADMIN — BACITRACIN ZINC SCH: 500 OINTMENT TOPICAL at 21:43

## 2017-04-29 RX ADMIN — Medication SCH TAB: at 09:51

## 2017-04-29 RX ADMIN — BACITRACIN ZINC SCH GM: 500 OINTMENT TOPICAL at 09:51

## 2017-04-29 RX ADMIN — METFORMIN HYDROCHLORIDE SCH MG: 500 TABLET ORAL at 07:06

## 2017-04-29 RX ADMIN — METFORMIN HYDROCHLORIDE SCH MG: 500 TABLET ORAL at 16:51

## 2017-04-29 RX ADMIN — INSULIN ASPART SCH UNITS: 100 INJECTION, SOLUTION INTRAVENOUS; SUBCUTANEOUS at 07:07

## 2017-04-30 RX ADMIN — BACITRACIN ZINC SCH GM: 500 OINTMENT TOPICAL at 09:46

## 2017-04-30 RX ADMIN — METFORMIN HYDROCHLORIDE SCH MG: 500 TABLET ORAL at 16:52

## 2017-04-30 RX ADMIN — BACITRACIN ZINC SCH GM: 500 OINTMENT TOPICAL at 21:26

## 2017-04-30 RX ADMIN — Medication SCH TAB: at 09:46

## 2017-04-30 RX ADMIN — ENALAPRIL MALEATE SCH MG: 5 TABLET ORAL at 09:46

## 2017-04-30 RX ADMIN — INSULIN ASPART SCH: 100 INJECTION, SOLUTION INTRAVENOUS; SUBCUTANEOUS at 07:10

## 2017-04-30 RX ADMIN — METFORMIN HYDROCHLORIDE SCH MG: 500 TABLET ORAL at 07:09

## 2017-04-30 RX ADMIN — INSULIN ASPART SCH UNITS: 100 INJECTION, SOLUTION INTRAVENOUS; SUBCUTANEOUS at 16:54

## 2017-04-30 RX ADMIN — Medication SCH MG: at 21:26

## 2017-05-01 RX ADMIN — ENALAPRIL MALEATE SCH MG: 5 TABLET ORAL at 10:14

## 2017-05-01 RX ADMIN — Medication SCH TAB: at 10:14

## 2017-05-01 RX ADMIN — INSULIN ASPART SCH UNITS: 100 INJECTION, SOLUTION INTRAVENOUS; SUBCUTANEOUS at 06:52

## 2017-05-01 RX ADMIN — METFORMIN HYDROCHLORIDE SCH MG: 500 TABLET ORAL at 16:53

## 2017-05-01 RX ADMIN — BACITRACIN ZINC SCH GM: 500 OINTMENT TOPICAL at 10:14

## 2017-05-01 RX ADMIN — BACITRACIN ZINC SCH GM: 500 OINTMENT TOPICAL at 22:11

## 2017-05-01 RX ADMIN — Medication SCH MG: at 22:11

## 2017-05-01 RX ADMIN — METFORMIN HYDROCHLORIDE SCH MG: 500 TABLET ORAL at 06:12

## 2017-05-01 RX ADMIN — INSULIN ASPART SCH UNITS: 100 INJECTION, SOLUTION INTRAVENOUS; SUBCUTANEOUS at 16:56

## 2017-05-02 RX ADMIN — INSULIN ASPART SCH UNITS: 100 INJECTION, SOLUTION INTRAVENOUS; SUBCUTANEOUS at 16:48

## 2017-05-02 RX ADMIN — Medication SCH MG: at 21:39

## 2017-05-02 RX ADMIN — METFORMIN HYDROCHLORIDE SCH MG: 500 TABLET ORAL at 06:38

## 2017-05-02 RX ADMIN — Medication SCH TAB: at 09:49

## 2017-05-02 RX ADMIN — BACITRACIN ZINC SCH GM: 500 OINTMENT TOPICAL at 09:49

## 2017-05-02 RX ADMIN — METFORMIN HYDROCHLORIDE SCH MG: 500 TABLET ORAL at 16:47

## 2017-05-02 RX ADMIN — BACITRACIN ZINC SCH GM: 500 OINTMENT TOPICAL at 21:38

## 2017-05-02 RX ADMIN — INSULIN ASPART SCH: 100 INJECTION, SOLUTION INTRAVENOUS; SUBCUTANEOUS at 06:38

## 2017-05-02 RX ADMIN — ENALAPRIL MALEATE SCH MG: 5 TABLET ORAL at 09:49

## 2017-05-03 RX ADMIN — Medication SCH TAB: at 09:42

## 2017-05-03 RX ADMIN — INSULIN ASPART SCH UNITS: 100 INJECTION, SOLUTION INTRAVENOUS; SUBCUTANEOUS at 16:39

## 2017-05-03 RX ADMIN — INSULIN ASPART SCH: 100 INJECTION, SOLUTION INTRAVENOUS; SUBCUTANEOUS at 06:19

## 2017-05-03 RX ADMIN — Medication SCH MG: at 21:35

## 2017-05-03 RX ADMIN — BACITRACIN ZINC SCH GM: 500 OINTMENT TOPICAL at 09:42

## 2017-05-03 RX ADMIN — METFORMIN HYDROCHLORIDE SCH MG: 500 TABLET ORAL at 16:38

## 2017-05-03 RX ADMIN — BACITRACIN ZINC SCH: 500 OINTMENT TOPICAL at 22:56

## 2017-05-03 RX ADMIN — ENALAPRIL MALEATE SCH MG: 5 TABLET ORAL at 09:43

## 2017-05-03 RX ADMIN — METFORMIN HYDROCHLORIDE SCH MG: 500 TABLET ORAL at 06:19

## 2017-05-04 RX ADMIN — METFORMIN HYDROCHLORIDE SCH MG: 500 TABLET ORAL at 06:22

## 2017-05-04 RX ADMIN — BACITRACIN ZINC SCH: 500 OINTMENT TOPICAL at 22:38

## 2017-05-04 RX ADMIN — INSULIN ASPART SCH UNITS: 100 INJECTION, SOLUTION INTRAVENOUS; SUBCUTANEOUS at 06:24

## 2017-05-04 RX ADMIN — Medication SCH MG: at 22:18

## 2017-05-04 RX ADMIN — ENALAPRIL MALEATE SCH MG: 5 TABLET ORAL at 09:49

## 2017-05-04 RX ADMIN — INSULIN ASPART SCH UNITS: 100 INJECTION, SOLUTION INTRAVENOUS; SUBCUTANEOUS at 16:31

## 2017-05-04 RX ADMIN — Medication SCH TAB: at 09:49

## 2017-05-04 RX ADMIN — METFORMIN HYDROCHLORIDE SCH MG: 500 TABLET ORAL at 16:30

## 2017-05-04 RX ADMIN — BACITRACIN ZINC SCH GM: 500 OINTMENT TOPICAL at 09:49

## 2017-05-05 RX ADMIN — ENALAPRIL MALEATE SCH MG: 5 TABLET ORAL at 09:55

## 2017-05-05 RX ADMIN — METFORMIN HYDROCHLORIDE SCH MG: 500 TABLET ORAL at 06:14

## 2017-05-05 RX ADMIN — INSULIN ASPART SCH UNITS: 100 INJECTION, SOLUTION INTRAVENOUS; SUBCUTANEOUS at 16:56

## 2017-05-05 RX ADMIN — BACITRACIN ZINC SCH: 500 OINTMENT TOPICAL at 09:52

## 2017-05-05 RX ADMIN — Medication SCH TAB: at 09:51

## 2017-05-05 RX ADMIN — LIDOCAINE SCH: 50 PATCH TOPICAL at 16:35

## 2017-05-05 RX ADMIN — Medication SCH MG: at 21:46

## 2017-05-05 RX ADMIN — BACITRACIN ZINC SCH: 500 OINTMENT TOPICAL at 21:47

## 2017-05-05 RX ADMIN — METFORMIN HYDROCHLORIDE SCH MG: 500 TABLET ORAL at 16:55

## 2017-05-05 RX ADMIN — INSULIN ASPART SCH: 100 INJECTION, SOLUTION INTRAVENOUS; SUBCUTANEOUS at 06:15

## 2017-05-06 RX ADMIN — INSULIN ASPART SCH UNITS: 100 INJECTION, SOLUTION INTRAVENOUS; SUBCUTANEOUS at 16:46

## 2017-05-06 RX ADMIN — METFORMIN HYDROCHLORIDE SCH MG: 500 TABLET ORAL at 16:45

## 2017-05-06 RX ADMIN — Medication SCH MG: at 21:35

## 2017-05-06 RX ADMIN — INSULIN ASPART SCH UNITS: 100 INJECTION, SOLUTION INTRAVENOUS; SUBCUTANEOUS at 06:36

## 2017-05-06 RX ADMIN — ENALAPRIL MALEATE SCH MG: 5 TABLET ORAL at 09:59

## 2017-05-06 RX ADMIN — Medication SCH TAB: at 09:59

## 2017-05-06 RX ADMIN — LIDOCAINE SCH: 50 PATCH TOPICAL at 09:59

## 2017-05-06 RX ADMIN — BACITRACIN ZINC SCH: 500 OINTMENT TOPICAL at 09:59

## 2017-05-06 RX ADMIN — BACITRACIN ZINC SCH: 500 OINTMENT TOPICAL at 22:14

## 2017-05-06 RX ADMIN — METFORMIN HYDROCHLORIDE SCH MG: 500 TABLET ORAL at 06:35

## 2017-05-07 RX ADMIN — ENALAPRIL MALEATE SCH MG: 5 TABLET ORAL at 09:44

## 2017-05-07 RX ADMIN — Medication SCH TAB: at 09:44

## 2017-05-07 RX ADMIN — METFORMIN HYDROCHLORIDE SCH MG: 500 TABLET ORAL at 06:19

## 2017-05-07 RX ADMIN — INSULIN ASPART SCH: 100 INJECTION, SOLUTION INTRAVENOUS; SUBCUTANEOUS at 06:21

## 2017-05-07 RX ADMIN — BACITRACIN ZINC SCH: 500 OINTMENT TOPICAL at 22:45

## 2017-05-07 RX ADMIN — LIDOCAINE SCH PATCH: 50 PATCH TOPICAL at 09:44

## 2017-05-07 RX ADMIN — METFORMIN HYDROCHLORIDE SCH MG: 500 TABLET ORAL at 16:42

## 2017-05-07 RX ADMIN — BACITRACIN ZINC SCH: 500 OINTMENT TOPICAL at 09:44

## 2017-05-07 RX ADMIN — INSULIN ASPART SCH UNITS: 100 INJECTION, SOLUTION INTRAVENOUS; SUBCUTANEOUS at 16:43

## 2017-05-07 RX ADMIN — Medication SCH MG: at 21:50

## 2017-05-08 RX ADMIN — ENALAPRIL MALEATE SCH MG: 5 TABLET ORAL at 09:53

## 2017-05-08 RX ADMIN — BACITRACIN ZINC SCH: 500 OINTMENT TOPICAL at 21:29

## 2017-05-08 RX ADMIN — Medication SCH MG: at 21:29

## 2017-05-08 RX ADMIN — METFORMIN HYDROCHLORIDE SCH MG: 500 TABLET ORAL at 07:06

## 2017-05-08 RX ADMIN — LIDOCAINE SCH PATCH: 50 PATCH TOPICAL at 09:53

## 2017-05-08 RX ADMIN — INSULIN ASPART SCH UNITS: 100 INJECTION, SOLUTION INTRAVENOUS; SUBCUTANEOUS at 17:06

## 2017-05-08 RX ADMIN — BACITRACIN ZINC SCH GM: 500 OINTMENT TOPICAL at 09:53

## 2017-05-08 RX ADMIN — Medication SCH TAB: at 09:53

## 2017-05-08 RX ADMIN — METFORMIN HYDROCHLORIDE SCH MG: 500 TABLET ORAL at 17:05

## 2017-05-08 RX ADMIN — INSULIN ASPART SCH UNITS: 100 INJECTION, SOLUTION INTRAVENOUS; SUBCUTANEOUS at 07:06

## 2017-05-09 RX ADMIN — BACITRACIN ZINC SCH: 500 OINTMENT TOPICAL at 23:36

## 2017-05-09 RX ADMIN — INSULIN ASPART SCH: 100 INJECTION, SOLUTION INTRAVENOUS; SUBCUTANEOUS at 07:11

## 2017-05-09 RX ADMIN — METFORMIN HYDROCHLORIDE SCH MG: 500 TABLET ORAL at 16:43

## 2017-05-09 RX ADMIN — BACITRACIN ZINC SCH GM: 500 OINTMENT TOPICAL at 09:47

## 2017-05-09 RX ADMIN — LIDOCAINE SCH PATCH: 50 PATCH TOPICAL at 09:47

## 2017-05-09 RX ADMIN — METFORMIN HYDROCHLORIDE SCH MG: 500 TABLET ORAL at 07:10

## 2017-05-09 RX ADMIN — Medication SCH MG: at 21:52

## 2017-05-09 RX ADMIN — Medication SCH TAB: at 09:47

## 2017-05-09 RX ADMIN — ENALAPRIL MALEATE SCH MG: 5 TABLET ORAL at 09:47

## 2017-05-09 RX ADMIN — INSULIN ASPART SCH UNITS: 100 INJECTION, SOLUTION INTRAVENOUS; SUBCUTANEOUS at 16:44

## 2017-05-10 RX ADMIN — Medication SCH MG: at 22:02

## 2017-05-10 RX ADMIN — LIDOCAINE SCH PATCH: 50 PATCH TOPICAL at 09:37

## 2017-05-10 RX ADMIN — Medication SCH TAB: at 09:37

## 2017-05-10 RX ADMIN — INSULIN ASPART SCH UNITS: 100 INJECTION, SOLUTION INTRAVENOUS; SUBCUTANEOUS at 16:50

## 2017-05-10 RX ADMIN — BACITRACIN ZINC SCH GM: 500 OINTMENT TOPICAL at 09:37

## 2017-05-10 RX ADMIN — METFORMIN HYDROCHLORIDE SCH MG: 500 TABLET ORAL at 16:50

## 2017-05-10 RX ADMIN — INSULIN ASPART SCH: 100 INJECTION, SOLUTION INTRAVENOUS; SUBCUTANEOUS at 07:12

## 2017-05-10 RX ADMIN — METFORMIN HYDROCHLORIDE SCH MG: 500 TABLET ORAL at 07:12

## 2017-05-10 RX ADMIN — BACITRACIN ZINC SCH GM: 500 OINTMENT TOPICAL at 22:03

## 2017-05-10 RX ADMIN — ENALAPRIL MALEATE SCH MG: 5 TABLET ORAL at 09:37

## 2017-05-11 RX ADMIN — BACITRACIN ZINC SCH: 500 OINTMENT TOPICAL at 21:50

## 2017-05-11 RX ADMIN — BACITRACIN ZINC SCH GM: 500 OINTMENT TOPICAL at 09:36

## 2017-05-11 RX ADMIN — LIDOCAINE SCH PATCH: 50 PATCH TOPICAL at 09:36

## 2017-05-11 RX ADMIN — INSULIN ASPART SCH: 100 INJECTION, SOLUTION INTRAVENOUS; SUBCUTANEOUS at 06:24

## 2017-05-11 RX ADMIN — Medication SCH TAB: at 09:36

## 2017-05-11 RX ADMIN — INSULIN ASPART SCH UNITS: 100 INJECTION, SOLUTION INTRAVENOUS; SUBCUTANEOUS at 16:47

## 2017-05-11 RX ADMIN — METFORMIN HYDROCHLORIDE SCH MG: 500 TABLET ORAL at 06:24

## 2017-05-11 RX ADMIN — METFORMIN HYDROCHLORIDE SCH MG: 500 TABLET ORAL at 16:46

## 2017-05-11 RX ADMIN — Medication SCH MG: at 21:49

## 2017-05-11 RX ADMIN — ENALAPRIL MALEATE SCH MG: 5 TABLET ORAL at 09:36

## 2017-05-12 RX ADMIN — ENALAPRIL MALEATE SCH: 5 TABLET ORAL at 10:02

## 2017-05-12 RX ADMIN — METFORMIN HYDROCHLORIDE SCH MG: 500 TABLET ORAL at 07:07

## 2017-05-12 RX ADMIN — METFORMIN HYDROCHLORIDE SCH MG: 500 TABLET ORAL at 16:40

## 2017-05-12 RX ADMIN — LIDOCAINE SCH PATCH: 50 PATCH TOPICAL at 09:59

## 2017-05-12 RX ADMIN — ENALAPRIL MALEATE SCH MG: 5 TABLET ORAL at 10:00

## 2017-05-12 RX ADMIN — BACITRACIN ZINC SCH: 500 OINTMENT TOPICAL at 21:35

## 2017-05-12 RX ADMIN — Medication SCH MG: at 21:35

## 2017-05-12 RX ADMIN — INSULIN ASPART SCH: 100 INJECTION, SOLUTION INTRAVENOUS; SUBCUTANEOUS at 07:07

## 2017-05-12 RX ADMIN — Medication SCH TAB: at 09:58

## 2017-05-12 RX ADMIN — INSULIN ASPART SCH UNITS: 100 INJECTION, SOLUTION INTRAVENOUS; SUBCUTANEOUS at 16:41

## 2017-05-12 RX ADMIN — BACITRACIN ZINC SCH GM: 500 OINTMENT TOPICAL at 09:58

## 2017-05-13 RX ADMIN — ENALAPRIL MALEATE SCH MG: 5 TABLET ORAL at 09:47

## 2017-05-13 RX ADMIN — BACITRACIN ZINC SCH: 500 OINTMENT TOPICAL at 21:38

## 2017-05-13 RX ADMIN — METFORMIN HYDROCHLORIDE SCH MG: 500 TABLET ORAL at 16:53

## 2017-05-13 RX ADMIN — Medication SCH TAB: at 09:47

## 2017-05-13 RX ADMIN — METFORMIN HYDROCHLORIDE SCH MG: 500 TABLET ORAL at 07:19

## 2017-05-13 RX ADMIN — Medication SCH MG: at 21:38

## 2017-05-13 RX ADMIN — LIDOCAINE SCH PATCH: 50 PATCH TOPICAL at 09:47

## 2017-05-13 RX ADMIN — INSULIN ASPART SCH UNITS: 100 INJECTION, SOLUTION INTRAVENOUS; SUBCUTANEOUS at 16:55

## 2017-05-13 RX ADMIN — BACITRACIN ZINC SCH: 500 OINTMENT TOPICAL at 09:48

## 2017-05-13 RX ADMIN — INSULIN ASPART SCH: 100 INJECTION, SOLUTION INTRAVENOUS; SUBCUTANEOUS at 07:19

## 2017-05-14 RX ADMIN — INSULIN ASPART SCH UNITS: 100 INJECTION, SOLUTION INTRAVENOUS; SUBCUTANEOUS at 16:50

## 2017-05-14 RX ADMIN — Medication SCH MG: at 21:44

## 2017-05-14 RX ADMIN — METFORMIN HYDROCHLORIDE SCH MG: 500 TABLET ORAL at 16:49

## 2017-05-14 RX ADMIN — ENALAPRIL MALEATE SCH MG: 5 TABLET ORAL at 09:42

## 2017-05-14 RX ADMIN — BACITRACIN ZINC SCH: 500 OINTMENT TOPICAL at 21:45

## 2017-05-14 RX ADMIN — Medication SCH TAB: at 09:42

## 2017-05-14 RX ADMIN — INSULIN ASPART SCH UNITS: 100 INJECTION, SOLUTION INTRAVENOUS; SUBCUTANEOUS at 07:14

## 2017-05-14 RX ADMIN — METFORMIN HYDROCHLORIDE SCH MG: 500 TABLET ORAL at 07:14

## 2017-05-14 RX ADMIN — BACITRACIN ZINC SCH: 500 OINTMENT TOPICAL at 09:43

## 2017-05-14 RX ADMIN — LIDOCAINE SCH PATCH: 50 PATCH TOPICAL at 09:43

## 2017-05-15 RX ADMIN — INSULIN ASPART SCH UNITS: 100 INJECTION, SOLUTION INTRAVENOUS; SUBCUTANEOUS at 16:48

## 2017-05-15 RX ADMIN — ENALAPRIL MALEATE SCH MG: 5 TABLET ORAL at 09:42

## 2017-05-15 RX ADMIN — BACITRACIN ZINC SCH GM: 500 OINTMENT TOPICAL at 09:42

## 2017-05-15 RX ADMIN — Medication SCH TAB: at 09:42

## 2017-05-15 RX ADMIN — METFORMIN HYDROCHLORIDE SCH MG: 500 TABLET ORAL at 16:47

## 2017-05-15 RX ADMIN — BACITRACIN ZINC SCH: 500 OINTMENT TOPICAL at 21:41

## 2017-05-15 RX ADMIN — Medication SCH MG: at 21:41

## 2017-05-15 RX ADMIN — METFORMIN HYDROCHLORIDE SCH MG: 500 TABLET ORAL at 06:28

## 2017-05-15 RX ADMIN — LIDOCAINE SCH PATCH: 50 PATCH TOPICAL at 09:42

## 2017-05-15 RX ADMIN — INSULIN ASPART SCH: 100 INJECTION, SOLUTION INTRAVENOUS; SUBCUTANEOUS at 07:12

## 2017-05-16 RX ADMIN — Medication SCH TAB: at 09:47

## 2017-05-16 RX ADMIN — METFORMIN HYDROCHLORIDE SCH MG: 500 TABLET ORAL at 16:23

## 2017-05-16 RX ADMIN — BACITRACIN ZINC SCH: 500 OINTMENT TOPICAL at 09:48

## 2017-05-16 RX ADMIN — Medication SCH MG: at 21:28

## 2017-05-16 RX ADMIN — INSULIN ASPART SCH UNITS: 100 INJECTION, SOLUTION INTRAVENOUS; SUBCUTANEOUS at 16:25

## 2017-05-16 RX ADMIN — ENALAPRIL MALEATE SCH MG: 5 TABLET ORAL at 09:48

## 2017-05-16 RX ADMIN — BACITRACIN ZINC SCH: 500 OINTMENT TOPICAL at 21:28

## 2017-05-16 RX ADMIN — METFORMIN HYDROCHLORIDE SCH MG: 500 TABLET ORAL at 07:11

## 2017-05-16 RX ADMIN — LIDOCAINE SCH PATCH: 50 PATCH TOPICAL at 09:48

## 2017-05-16 RX ADMIN — INSULIN ASPART SCH: 100 INJECTION, SOLUTION INTRAVENOUS; SUBCUTANEOUS at 07:11

## 2017-05-17 RX ADMIN — Medication SCH MG: at 21:35

## 2017-05-17 RX ADMIN — METFORMIN HYDROCHLORIDE SCH MG: 500 TABLET ORAL at 07:10

## 2017-05-17 RX ADMIN — BACITRACIN ZINC SCH: 500 OINTMENT TOPICAL at 09:45

## 2017-05-17 RX ADMIN — INSULIN ASPART SCH: 100 INJECTION, SOLUTION INTRAVENOUS; SUBCUTANEOUS at 07:10

## 2017-05-17 RX ADMIN — BACITRACIN ZINC SCH: 500 OINTMENT TOPICAL at 21:35

## 2017-05-17 RX ADMIN — Medication SCH TAB: at 09:45

## 2017-05-17 RX ADMIN — INSULIN ASPART SCH UNITS: 100 INJECTION, SOLUTION INTRAVENOUS; SUBCUTANEOUS at 16:47

## 2017-05-17 RX ADMIN — ENALAPRIL MALEATE SCH MG: 5 TABLET ORAL at 09:45

## 2017-05-17 RX ADMIN — LIDOCAINE SCH PATCH: 50 PATCH TOPICAL at 09:46

## 2017-05-17 RX ADMIN — METFORMIN HYDROCHLORIDE SCH MG: 500 TABLET ORAL at 16:45

## 2017-05-18 RX ADMIN — METFORMIN HYDROCHLORIDE SCH MG: 500 TABLET ORAL at 07:04

## 2017-05-18 RX ADMIN — BACITRACIN ZINC SCH: 500 OINTMENT TOPICAL at 21:28

## 2017-05-18 RX ADMIN — ENALAPRIL MALEATE SCH MG: 5 TABLET ORAL at 09:44

## 2017-05-18 RX ADMIN — Medication SCH MG: at 21:28

## 2017-05-18 RX ADMIN — Medication SCH TAB: at 09:44

## 2017-05-18 RX ADMIN — BACITRACIN ZINC SCH: 500 OINTMENT TOPICAL at 09:47

## 2017-05-18 RX ADMIN — INSULIN ASPART SCH UNITS: 100 INJECTION, SOLUTION INTRAVENOUS; SUBCUTANEOUS at 16:39

## 2017-05-18 RX ADMIN — LIDOCAINE SCH PATCH: 50 PATCH TOPICAL at 09:44

## 2017-05-18 RX ADMIN — METFORMIN HYDROCHLORIDE SCH MG: 500 TABLET ORAL at 16:36

## 2017-05-18 RX ADMIN — INSULIN ASPART SCH: 100 INJECTION, SOLUTION INTRAVENOUS; SUBCUTANEOUS at 07:04

## 2017-05-19 LAB
APPEARANCE UR: CLEAR
BILIRUB UR STRIP.AUTO-MCNC: NEGATIVE MG/DL
COLOR UR: YELLOW
KETONES UR QL STRIP: NEGATIVE
LEUKOCYTE ESTERASE UR QL STRIP.AUTO: NEGATIVE
NITRITE UR QL STRIP: NEGATIVE
PH UR: 5 [PH] (ref 5–8)
PROT UR QL STRIP: (no result)
PROT UR QL STRIP: NEGATIVE
RBC # UR STRIP: NEGATIVE /UL
SP GR UR: 1.02 (ref 1–1.02)
UROBILINOGEN UR STRIP-MCNC: (no result) E.U./DL (ref 0.2–1)

## 2017-05-19 RX ADMIN — Medication SCH TAB: at 10:01

## 2017-05-19 RX ADMIN — LIDOCAINE SCH PATCH: 50 PATCH TOPICAL at 10:02

## 2017-05-19 RX ADMIN — HYDROCORTISONE SCH APPLIC: 1 CREAM TOPICAL at 17:16

## 2017-05-19 RX ADMIN — ENALAPRIL MALEATE SCH MG: 5 TABLET ORAL at 10:01

## 2017-05-19 RX ADMIN — Medication SCH MG: at 21:40

## 2017-05-19 RX ADMIN — METFORMIN HYDROCHLORIDE SCH MG: 500 TABLET ORAL at 06:59

## 2017-05-19 RX ADMIN — HYDROCORTISONE SCH APPLIC: 1 CREAM TOPICAL at 21:40

## 2017-05-19 RX ADMIN — BACITRACIN ZINC SCH: 500 OINTMENT TOPICAL at 10:02

## 2017-05-19 RX ADMIN — INSULIN ASPART SCH: 100 INJECTION, SOLUTION INTRAVENOUS; SUBCUTANEOUS at 07:00

## 2017-05-19 RX ADMIN — INSULIN ASPART SCH UNITS: 100 INJECTION, SOLUTION INTRAVENOUS; SUBCUTANEOUS at 16:53

## 2017-05-19 RX ADMIN — BACITRACIN ZINC SCH: 500 OINTMENT TOPICAL at 21:41

## 2017-05-19 RX ADMIN — METFORMIN HYDROCHLORIDE SCH MG: 500 TABLET ORAL at 16:52

## 2017-05-20 RX ADMIN — INSULIN ASPART SCH UNITS: 100 INJECTION, SOLUTION INTRAVENOUS; SUBCUTANEOUS at 16:36

## 2017-05-20 RX ADMIN — Medication SCH TAB: at 09:37

## 2017-05-20 RX ADMIN — LIDOCAINE SCH PATCH: 50 PATCH TOPICAL at 09:36

## 2017-05-20 RX ADMIN — HYDROCORTISONE SCH APPLIC: 1 CREAM TOPICAL at 21:38

## 2017-05-20 RX ADMIN — METFORMIN HYDROCHLORIDE SCH MG: 500 TABLET ORAL at 06:24

## 2017-05-20 RX ADMIN — BACITRACIN ZINC SCH: 500 OINTMENT TOPICAL at 22:49

## 2017-05-20 RX ADMIN — ENALAPRIL MALEATE SCH MG: 5 TABLET ORAL at 09:37

## 2017-05-20 RX ADMIN — POLYVINYL ALCOHOL SCH DROP: 14 SOLUTION/ DROPS OPHTHALMIC at 21:35

## 2017-05-20 RX ADMIN — Medication SCH MG: at 21:37

## 2017-05-20 RX ADMIN — INSULIN ASPART SCH: 100 INJECTION, SOLUTION INTRAVENOUS; SUBCUTANEOUS at 06:25

## 2017-05-20 RX ADMIN — METFORMIN HYDROCHLORIDE SCH MG: 500 TABLET ORAL at 16:34

## 2017-05-20 RX ADMIN — HYDROCORTISONE SCH APPLIC: 1 CREAM TOPICAL at 09:37

## 2017-05-20 RX ADMIN — BACITRACIN ZINC SCH: 500 OINTMENT TOPICAL at 09:36

## 2017-05-21 RX ADMIN — ENALAPRIL MALEATE SCH MG: 5 TABLET ORAL at 09:41

## 2017-05-21 RX ADMIN — INSULIN ASPART SCH: 100 INJECTION, SOLUTION INTRAVENOUS; SUBCUTANEOUS at 06:49

## 2017-05-21 RX ADMIN — LIDOCAINE SCH PATCH: 50 PATCH TOPICAL at 09:41

## 2017-05-21 RX ADMIN — METFORMIN HYDROCHLORIDE SCH MG: 500 TABLET ORAL at 16:38

## 2017-05-21 RX ADMIN — Medication SCH MG: at 21:56

## 2017-05-21 RX ADMIN — POLYVINYL ALCOHOL SCH: 14 SOLUTION/ DROPS OPHTHALMIC at 06:49

## 2017-05-21 RX ADMIN — BACITRACIN ZINC SCH GM: 500 OINTMENT TOPICAL at 09:41

## 2017-05-21 RX ADMIN — INSULIN ASPART SCH UNITS: 100 INJECTION, SOLUTION INTRAVENOUS; SUBCUTANEOUS at 16:40

## 2017-05-21 RX ADMIN — POLYVINYL ALCOHOL SCH DROP: 14 SOLUTION/ DROPS OPHTHALMIC at 13:12

## 2017-05-21 RX ADMIN — HYDROCORTISONE SCH APPLIC: 1 CREAM TOPICAL at 09:42

## 2017-05-21 RX ADMIN — BACITRACIN ZINC SCH: 500 OINTMENT TOPICAL at 22:09

## 2017-05-21 RX ADMIN — Medication SCH TAB: at 09:41

## 2017-05-21 RX ADMIN — METFORMIN HYDROCHLORIDE SCH MG: 500 TABLET ORAL at 06:48

## 2017-05-21 RX ADMIN — POLYVINYL ALCOHOL SCH: 14 SOLUTION/ DROPS OPHTHALMIC at 22:09

## 2017-05-21 RX ADMIN — HYDROCORTISONE SCH APPLIC: 1 CREAM TOPICAL at 21:56

## 2017-05-22 VITALS — HEART RATE: 83 BPM | DIASTOLIC BLOOD PRESSURE: 74 MMHG | TEMPERATURE: 98.8 F | SYSTOLIC BLOOD PRESSURE: 126 MMHG

## 2017-05-22 RX ADMIN — HYDROCORTISONE SCH: 1 CREAM TOPICAL at 09:49

## 2017-05-22 RX ADMIN — BACITRACIN ZINC SCH: 500 OINTMENT TOPICAL at 09:49

## 2017-05-22 RX ADMIN — LIDOCAINE SCH PATCH: 50 PATCH TOPICAL at 09:49

## 2017-05-22 RX ADMIN — ENALAPRIL MALEATE SCH MG: 5 TABLET ORAL at 09:48

## 2017-05-22 RX ADMIN — INSULIN ASPART SCH: 100 INJECTION, SOLUTION INTRAVENOUS; SUBCUTANEOUS at 07:01

## 2017-05-22 RX ADMIN — POLYVINYL ALCOHOL SCH DROP: 14 SOLUTION/ DROPS OPHTHALMIC at 06:30

## 2017-05-22 RX ADMIN — METFORMIN HYDROCHLORIDE SCH MG: 500 TABLET ORAL at 07:01

## 2017-05-22 RX ADMIN — Medication SCH TAB: at 09:48

## 2017-05-22 NOTE — PN
Psychiatric Progress Note


Vital Signs: 


 Vital Signs











 Period  Temp  Pulse  Resp  BP Sys/Licea  Pulse Ox


 


 Last 24 Hr  98.8 F  83-93  18-18  104-126/68-74  











Date of Session: 05/22/17


Chief Complaint:: Discharge Note


HPI: Patient addressing Alcohol Dependence comorbid with Nicotine Dependence 

and Substance-Induced Sleep Disorder


ROS: NIDDM, HTN were medically managed


Current Medications: 


Active Medications











Generic Name Dose Route Start Last Admin





  Trade Name Freq  PRN Reason Stop Dose Admin


 


Acetaminophen  650 mg 04/25/17 13:22  





  Tylenol -  PO   





  Q4H PRN   





  FEVER OR PAIN   


 


Al Hydroxide/Mg Hydroxide  30 ml 04/25/17 13:22  





  Mylanta Oral Suspension -  PO   





  Q6H PRN   





  DYSPEPSIA   


 


Artificial Tears  1 drop 05/20/17 22:00 05/22/17 06:30





  Artificial Tears  OU   1 drop





  TID KELI   Administration


 


Bacitracin  0.9 gm 04/25/17 22:00 05/21/17 22:09





  Bacitracin -  TP   Not Given





  BID KELI   


 


Diphenhydramine HCl  50 mg 04/25/17 13:22 05/21/17 23:26





  Benadryl -  PO   50 mg





  HSMR1 PRN   Administration





  FOR ITCHING   


 


Enalapril Maleate  5 mg 04/26/17 10:00 05/21/17 09:41





  Vasotec -  PO   5 mg





  DAILY KELI   Administration


 


Eucalyptus/Menthol/Phenol/Sorbitol  1 each 04/25/17 13:22  





  Cepastat Lozenge -  MM   





  Q4H PRN   





  SORE THROAT   


 


Guaifenesin  10 ml 04/25/17 13:22  





  Robitussin Dm -  PO   





  Q6H PRN   





  COUGH   


 


Hydrocortisone  1 applic 05/19/17 14:00 05/21/17 21:56





  Hytone 1% Cream -  TP   1 applic





  BID KELI   Administration


 


Ibuprofen  400 mg 04/25/17 13:22  





  Motrin -  PO   





  Q6H PRN   





  PAIN   


 


Insulin Aspart  1 vial 04/25/17 16:30 05/22/17 07:01





  Novolog Vial Sliding Scale -  SQ   Not Given





  BIDAC [facility]   





  Protocol   


 


Lidocaine  1 patch 05/05/17 12:45 05/21/17 09:41





  Lidoderm Patch -  TP   1 patch





  DAILY KELI   Administration


 


Loperamide HCl  4 mg 04/25/17 13:22  





  Imodium -  PO   





  Q6H PRN   





  DIARRHEA   


 


Magnesium Hydroxide  30 ml 04/25/17 13:22  





  Milk Of Magnesia -  PO   





  DAILY PRN   





  CONSTIPATION   


 


Metformin HCl  1,000 mg 04/25/17 16:30 05/22/17 07:01





  Glucophage -  PO   1,000 mg





  BID@0700,1630 KELI   Administration


 


Prenatal Multivit/Folic Acid/Iron  1 tab 04/26/17 10:00 05/21/17 09:41





  Prenatal Vitamins (Sjr) -  PO   1 tab





  DAILY KELI   Administration


 


Pseudoephedrine/Triprolidine  1 combo 04/25/17 13:22  





  Actifed -  PO   





  TID PRN   





  NASAL CONGESTION   


 


Thiamine HCl  100 mg 04/25/17 22:00 05/21/17 21:56





  Vitamin B1 -  PO   100 mg





  HS KELI   Administration











Current Side Effect: No


Lab tests ordered: Yes


Lab tests reviewed: Yes


Provider note:: Patient has completed this program today. He has met his 

treatment goals and will continue to address his issues in outpatient treatment 

at [facility]. Told writer that from his participation in this program, he 

has learned the importance of establishing a sober support network in order to 

maintain sobriety. He is stable for discharge today


Total face to face time:: 35





Mental Status Exam





- Mental Status Exam


Alert and Oriented to: Time, Place, Person


Cognitive Function: Fair


Patient Appearance: Well Groomed


Mood: Hopeful, Euthymic


Affect: Appropriate


Patient Behavior: Cooperative


Speech Pattern: Clear


Voice Loudness: Normal


Thought Process: Intact


Thought Disorder: Not Present


Hallucinations: Denies


Suicidal Ideation: Denies


Homicidal Ideation: Denies


Insight/Judgement: Fair


Sleep: Fair


Appetite: Good


Muscle strength/Tone: Normal


Gait/Station: Normal





Psychiatric Treatment Plan





- Problem List


(1) Alcohol dependence with uncomplicated withdrawal


Current Visit: No





(2) Nicotine dependence


Current Visit: Yes   





(3) Substance-induced sleep disorder


Current Visit: Yes





(4) DM Diabetes mellitus type 2


Current Visit: No





(5) HTN (hypertension)


Current Visit: No   


Initial treatment plan: Patient is discharged today and referred to [facility] for outpatient treatment

## 2017-07-10 ENCOUNTER — HOSPITAL ENCOUNTER (INPATIENT)
Dept: HOSPITAL 74 - JER | Age: 52
LOS: 9 days | Discharge: HOME | End: 2017-07-19
Attending: INTERNAL MEDICINE | Admitting: INTERNAL MEDICINE
Payer: COMMERCIAL

## 2017-07-10 VITALS — BODY MASS INDEX: 30 KG/M2

## 2017-07-10 DIAGNOSIS — E11.9: ICD-10-CM

## 2017-07-10 DIAGNOSIS — E87.1: ICD-10-CM

## 2017-07-10 DIAGNOSIS — A41.9: ICD-10-CM
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
## 2017-07-10 DIAGNOSIS — R19.7: ICD-10-CM

## 2017-07-10 DIAGNOSIS — S00.11XA: ICD-10-CM

## 2017-07-10 DIAGNOSIS — F10.229: Primary | ICD-10-CM

## 2017-07-10 DIAGNOSIS — D69.6: ICD-10-CM

## 2017-07-10 DIAGNOSIS — S09.90XA: ICD-10-CM

## 2017-07-10 DIAGNOSIS — E86.0: ICD-10-CM

## 2017-07-10 DIAGNOSIS — R11.2: ICD-10-CM

## 2017-07-10 DIAGNOSIS — J96.01: ICD-10-CM

## 2017-07-10 DIAGNOSIS — F10.230: ICD-10-CM

## 2017-07-10 DIAGNOSIS — Z91.81: ICD-10-CM

## 2017-07-10 DIAGNOSIS — E87.6: ICD-10-CM
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
## 2017-07-10 DIAGNOSIS — S63.91XA: ICD-10-CM

## 2017-07-10 DIAGNOSIS — J69.0: ICD-10-CM
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
## 2017-07-10 DIAGNOSIS — Z79.84: ICD-10-CM

## 2017-07-10 DIAGNOSIS — K70.9: ICD-10-CM
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
## 2017-07-10 LAB
ALBUMIN SERPL-MCNC: 2.3 G/DL (ref 3.4–5)
ALP SERPL-CCNC: 146 U/L (ref 45–117)
ALT SERPL-CCNC: 41 U/L (ref 12–78)
ANION GAP SERPL CALC-SCNC: 10 MMOL/L (ref 8–16)
AST SERPL-CCNC: 122 U/L (ref 15–37)
BASOPHILS # BLD: 0.4 % (ref 0–2)
BILIRUB SERPL-MCNC: 1.6 MG/DL (ref 0.2–1)
CALCIUM SERPL-MCNC: 7.2 MG/DL (ref 8.5–10.1)
CO2 SERPL-SCNC: 26 MMOL/L (ref 21–32)
CREAT SERPL-MCNC: 0.8 MG/DL (ref 0.7–1.3)
DEPRECATED RDW RBC AUTO: 15.3 % (ref 11.9–15.9)
EOSINOPHIL # BLD: 0 % (ref 0–4.5)
GLUCOSE SERPL-MCNC: 158 MG/DL (ref 74–106)
MCH RBC QN AUTO: 31.1 PG (ref 25.7–33.7)
MCHC RBC AUTO-ENTMCNC: 35.2 G/DL (ref 32–35.9)
MCV RBC: 88.6 FL (ref 80–96)
NEUTROPHILS # BLD: 77.9 % (ref 42.8–82.8)
PLATELET # BLD AUTO: 53 K/MM3 (ref 134–434)
PMV BLD: 10.1 FL (ref 7.5–11.1)
PROT SERPL-MCNC: 7.3 G/DL (ref 6.4–8.2)
WBC # BLD AUTO: 7.9 K/MM3 (ref 4–10)

## 2017-07-10 PROCEDURE — HZ2ZZZZ DETOXIFICATION SERVICES FOR SUBSTANCE ABUSE TREATMENT: ICD-10-PCS | Performed by: INTERNAL MEDICINE

## 2017-07-10 RX ADMIN — Medication SCH MG: at 21:15

## 2017-07-10 RX ADMIN — CEFTRIAXONE SCH MLS/HR: 1 INJECTION, POWDER, FOR SOLUTION INTRAMUSCULAR; INTRAVENOUS at 21:09

## 2017-07-10 RX ADMIN — SODIUM CHLORIDE SCH MLS/HR: 9 INJECTION, SOLUTION INTRAVENOUS at 21:09

## 2017-07-10 NOTE — PDOC
Attending Attestation





- Resident


Resident Name: PatiLen





- ED Attending Attestation


I have performed the following: I have examined & evaluated the patient, The 

case was reviewed & discussed with the resident, I agree w/resident's findings 

& plan, Exceptions are as noted





- HPI


HPI: 


07/10/17 13:34





52-year-old male with history of diabetes and alcohol abuse Presents with 

mechanical fall. The patient reported that he had several drinks of beer and he 

fell. Reports that he struck the right side of his face and thinks may have had 

a brief loss of conscious. Patient denies headache, nausea or vomiting. Reports 

feeling somewhat intoxicated. Patient does report that lately he had some few 

loose stools and nausea and vomiting. Denies abdominal pain at this time. 

Temperature is noted 100.5 here.





Patient is also complaining about some generalized right hand pain but no focal 

tenderness. States that this has occurred secondary to fall.





- Physicial Exam


PE: 


07/10/17 13:36





GENERAL: Awake, alert, and fully oriented, in no acute distress. 


HEAD: No signs of trauma


EYES: PERRLA, EOMI, sclera anicteric, conjunctiva clear


ENT: Auricles normal inspection, hearing grossly normal, nares patent, 

oropharynx clear without exudates.  


NECK: Normal ROM, supple, no lymphadenopathy, JVD, or masses


LUNGS: Breath sounds equal, clear to auscultation bilaterally.  No wheezes, and 

no crackles


HEART: Regular rate and rhythm, normal S1 and S2, no murmurs, rubs or gallops


ABDOMEN: Soft, nontender, normoactive bowel sounds.  No guarding, no rebound.  

No masses


EXTREMITIES: Normal range of motion, no edema.  No clubbing or cyanosis. No 

cords, erythema, or tenderness


RUE: 2+ radial pulse. sensation intact throughout. median/radian/ulnar nerve 

functions intact. FROM all digits and wrist. No wrist instability appreciated. 

No axial loading tenderness or snuffbox tenderness. Able to  with full 

strength.


NEUROLOGICAL: Cranial nerves II through XII grossly intact.  Normal speech, 

normal gait


SKIN: Warm, Dry, normal turgor. Small abrasion near right temple. No 

lacerations.











- Medical Decision Making





07/10/17 13:37





I agree that the patient will need a head CT to rule out intracranial 

hemorrhage secondary to an injury. Patient is intoxicated with alcohol on 

breath. I suspect the patient likely had a right hand sprain but I do not 

suspect fracture at this time. However, we'll obtain an x-ray to rule out 

occult fracture. Patient's blood work demonstrates hypokalemia and 

hyponatremia. This may potentially be secondary to his loose stools. Ultimately

, the patient admitted to hospital further evaluation. Patient's blood work 

suggests that he may be developing cirrhosis secondary to alcohol. Admit.

## 2017-07-10 NOTE — HP
Admitting History and Physical





- Primary Care Physician


PCP: Rosario Stark





- Admission


Chief Complaint: fall


History of Present Illness: 


52-year-old male with history of diabetes and alcohol abuse Presents with 

mechanical fall. The patient reported that he had several drinks of beer and he 

fell. Reports that he struck the right side of his face and thinks may have had 

a brief loss of conscious. Patient denies headache, nausea or vomiting. Reports 

feeling somewhat intoxicated. Patient does report that lately he had some few 

loose stools and nausea and vomiting. Denies abdominal pain at this time. 

Temperature is noted 100.5 here.














- Past Medical History


Endocrine: Yes: Diabetes Mellitus





- Smoking History


Smoking history: Never smoked


Have you smoked in the past 12 months: No


Aproximately how many cigarettes per day: 0





- Alcohol/Substance Use


Hx Alcohol Use: Yes





Home Medications





- Allergies


Allergies/Adverse Reactions: 


 Allergies











Allergy/AdvReac Type Severity Reaction Status Date / Time


 


No Known Allergies Allergy   Verified 07/10/17 11:29














- Home Medications


Home Medications: 


Ambulatory Orders





Enalapril Maleate [Vasotec -] 5 mg PO DAILY #30 tablet 05/22/17 


Metformin HCl [Glucophage -] 1,000 mg PO BID@0700,1630 #60 tablet 05/22/17 











Family Disease History





- Family Disease History


Family Disease History: Other: Father (ETOH DEPENDENCE )





Physical Examination


Vital Signs: 


 Vital Signs











Temperature  103.0 F H  07/10/17 16:56


 


Pulse Rate  94 H  07/10/17 16:56


 


Respiratory Rate  18   07/10/17 16:56


 


Blood Pressure  134/65   07/10/17 16:56


 


O2 Sat by Pulse Oximetry (%)  95   07/10/17 16:56











Constitutional: Yes: No Distress


HENT: Yes: Atraumatic


Neck: Yes: Supple


Cardiovascular: Yes: Regular Rate and Rhythm


Respiratory: Yes: CTA Bilaterally


Gastrointestinal: Yes: Normal Bowel Sounds


Extremities: Yes: WNL


Edema: No


Peripheral Pulses WNL: Yes


Neurological: Yes: Alert, Oriented





Imaging





- Results


X-ray: Report Reviewed





Problem List





- Problems


(1) ETOH abuse


Assessment/Plan: 


on librium protocol


Code(s): F10.10 - ALCOHOL ABUSE, UNCOMPLICATED





(2) Hypokalemia


Assessment/Plan: 


will replace





Code(s): E87.6 - HYPOKALEMIA





(3) Hyponatremia


Assessment/Plan: 


will monitor


Code(s): E87.1 - HYPO-OSMOLALITY AND HYPONATREMIA





(4) Alcohol dependence with uncomplicated withdrawal


Assessment/Plan: 


pen ativan


detox consult


Code(s): F10.230 - ALCOHOL DEPENDENCE WITH WITHDRAWAL, UNCOMPLICATED





(5) Alcoholic liver disease


Code(s): K70.9 - ALCOHOLIC LIVER DISEASE, UNSPECIFIED





(6) DM Diabetes mellitus type 2


Code(s): E11.9 - TYPE 2 DIABETES MELLITUS WITHOUT COMPLICATIONS





(7) HTN (hypertension)


Code(s): I10 - ESSENTIAL (PRIMARY) HYPERTENSION   








Assessment/Plan


 Laboratory Tests











  07/10/17 07/10/17 07/10/17





  12:29 12:29 12:29


 


WBC  7.9  


 


RBC  3.93 L  


 


Hgb  12.2  


 


Hct  34.8 L  


 


MCV  88.6  


 


MCH  31.1  


 


MCHC  35.2  


 


RDW  15.3  D  


 


Plt Count  53 L D  


 


MPV  10.1  


 


Neutrophils %  77.9  D  


 


Lymphocytes %  10.6  D  


 


Monocytes %  11.1 H  


 


Eosinophils %  0.0  D  


 


Basophils %  0.4  


 


Sodium   119 L* D 


 


Potassium   2.7 L* D 


 


Chloride   83 L D 


 


Carbon Dioxide   26 


 


Anion Gap   10 


 


BUN   6 L 


 


Creatinine   0.8  D 


 


Creat Clearance w eGFR   > 60 


 


Random Glucose   158 H 


 


Calcium   7.2 L 


 


Total Bilirubin   1.6 H D 


 


AST   122 H D 


 


ALT   41  D 


 


Alkaline Phosphatase   146 H D 


 


Total Protein   7.3 


 


Albumin   2.3 L 


 


Alcohol, Quantitative    153.8 H*








Active Medications











Generic Name Dose Route Start Last Admin





  Trade Name Freq  PRN Reason Stop Dose Admin


 


Chlordiazepoxide HCl  25 mg 07/10/17 15:23  





  Librium -  PO 07/13/17 15:22  





  Q4H PRN   





  WITHDRAWAL(CONT SUBST)   


 


Chlordiazepoxide HCl  50 mg 07/10/17 17:00 07/10/17 19:02





  Librium -  PO 07/11/17 11:01  50 mg





  [id number] KELI   Administration


 


Chlordiazepoxide HCl  25 mg 07/11/17 17:00  





  Librium -  PO 07/12/17 11:01  





  [id number] KELI   


 


Chlordiazepoxide HCl  15 mg 07/12/17 17:00  





  Librium -  PO 07/13/17 11:01  





  [id number] KELI   


 


Folic Acid  1 mg 07/11/17 10:00  





  Folic Acid -  PO   





  DAILY [facility]   


 


Multivitamins/Minerals/Vitamin C  1 tab 07/11/17 10:00  





  Tab-A-Vit -  PO   





  DAILY KELI   


 


Thiamine HCl  100 mg 07/10/17 22:00  





  Vitamin B1 -  PO   





  BID KELI

## 2017-07-10 NOTE — CONSULT
Consult Detox [facility]


Reason for Current Admission/Consult: Alcohol detox


Referred by:: Len Krueger Res





- History


History of Present Illness: 


53 y/o man with a long hx. of alcoholism is admitted following a fall with head 

trauma.Pt. was in our detox in April 2017





- History Source


History Provided By: Patient, Medical Record


Limitations to Obtaining History: Other (drowsy)





- Alcohol/Substance Use


Hx Alcohol Use: Yes





- Current Drug/Alcohol Use


  ** Alcohol


Route: Oral


Frequency: Daily


Amount used: Beer 1-2(6 packs)


Age of first use: 13


Date of Last Use: 07/10/17





- Significant


Medical Findings: 


 Laboratory Last Values











WBC  7.9 K/mm3 (4.0-10.0)   07/10/17  12:29    


 


RBC  3.93 M/mm3 (4.00-5.60)  L  07/10/17  12:29    


 


Hgb  12.2 GM/dL (11.7-16.9)   07/10/17  12:29    


 


Hct  34.8 % (35.4-49)  L  07/10/17  12:29    


 


MCV  88.6 fl (80-96)   07/10/17  12:29    


 


MCH  31.1 pg (25.7-33.7)   07/10/17  12:29    


 


MCHC  35.2 g/dl (32.0-35.9)   07/10/17  12:29    


 


RDW  15.3 % (11.9-15.9)  D 07/10/17  12:29    


 


Plt Count  53 K/MM3 (134-434)  L D 07/10/17  12:29    


 


MPV  10.1 fl (7.5-11.1)   07/10/17  12:29    


 


Neutrophils %  77.9 % (42.8-82.8)  D 07/10/17  12:29    


 


Lymphocytes %  10.6 % (8-40)  D 07/10/17  12:29    


 


Monocytes %  11.1 % (3.8-10.2)  H  07/10/17  12:29    


 


Eosinophils %  0.0 % (0-4.5)  D 07/10/17  12:29    


 


Basophils %  0.4 % (0-2.0)   07/10/17  12:29    


 


Sodium  119 mmol/L (136-145)  L* D 07/10/17  12:29    


 


Potassium  2.7 mmol/L (3.5-5.1)  L* D 07/10/17  12:29    


 


Chloride  83 mmol/L ()  L D 07/10/17  12:29    


 


Carbon Dioxide  26 mmol/L (21-32)   07/10/17  12:29    


 


Anion Gap  10  (8-16)   07/10/17  12:29    


 


BUN  6 mg/dL (7-18)  L  07/10/17  12:29    


 


Creatinine  0.8 mg/dL (0.7-1.3)  D 07/10/17  12:29    


 


Creat Clearance w eGFR  > 60  (>60)   07/10/17  12:29    


 


Random Glucose  158 mg/dL ()  H  07/10/17  12:29    


 


Calcium  7.2 mg/dL (8.5-10.1)  L  07/10/17  12:29    


 


Total Bilirubin  1.6 mg/dL (0.2-1.0)  H D 07/10/17  12:29    


 


AST  122 U/L (15-37)  H D 07/10/17  12:29    


 


ALT  41 U/L (12-78)  D 07/10/17  12:29    


 


Alkaline Phosphatase  146 U/L ()  H D 07/10/17  12:29    


 


Total Protein  7.3 g/dl (6.4-8.2)   07/10/17  12:29    


 


Albumin  2.3 g/dl (3.4-5.0)  L  07/10/17  12:29    


 


Alcohol, Quantitative  153.8 mg/dl (0-5)  H*  07/10/17  12:29    








labs noted





Assessment Plan





- Diagnosis


(1) Alcohol dependence with uncomplicated withdrawal


Status: Acute








- Plan


Plan: 


Refer to in-pt. Rehab





- Medication


Detox Regimen/Protocol: Librium

## 2017-07-10 NOTE — PDOC
History of Present Illness





- General


Chief Complaint: Injury


Stated Complaint: Alcohol intoxication/FALL


Time Seen by Provider: 07/10/17 11:54


History Source: Patient


Exam Limitations: Intoxication





- History of Present Illness


Initial Comments: 


07/10/17 12:25


52M with pmh of EtOH dependance and DM2 presents to the the ED after a fall 

looking for detox.


He was last seen and admitted for the same reason 3 months ago. Patient 

presents with abrasion of the right zygomatic area.


PAtient claims to have drank 5 big cans of "four crystal"  





07/10/17 13:43





Occurred: reports: just prior to arrival


Severity: reports: mild


Pain Location: reports: face, upper extremity


Method of Injury: Yes: fall


Loss of Consciousness: unsure





Past History





- Travel


Traveled outside of the country in the last 30 days: No





- Past Medical History


Allergies/Adverse Reactions: 


 Allergies











Allergy/AdvReac Type Severity Reaction Status Date / Time


 


No Known Allergies Allergy   Verified 07/10/17 11:29











Home Medications: 


Ambulatory Orders





Enalapril Maleate [Vasotec -] 5 mg PO DAILY #30 tablet 05/22/17 


Metformin HCl [Glucophage -] 1,000 mg PO BID@0700,1630 #60 tablet 05/22/17 








Anemia: No


Asthma: No


Cancer: No


Cardiac Disorders: No


CVA: No


COPD: No


CHF: No


Dementia: No


Diabetes: Yes


GI Disorders: No


 Disorders: No


HTN: No


Hypercholesterolemia: No


Kidney Stones: No


Liver Disease: No


Suicide Attempt (Hx): No


Seizures: No


Thyroid Disease: No


Other medical history: alcoholism





- Surgical History


Abdominal Surgery: No


Appendectomy: No


Cardiac Surgery: No


Cholecystectomy: No


Lung Surgery: No


Neurologic Surgery: No


Orthopedic Surgery: No





- Reproductive History


Testicular Surgery: No





- Psycho/Social/Smoking Cessation Hx


Anxiety: Yes


Suicidal Ideation: No


Smoking Status: Yes


Smoking History: Never smoked


Have you smoked in the past 12 months: No


Number of Cigarettes Smoked Daily: 0


Cigars Per Day: 0


Information on smoking cessation initiated: No


'Breaking Loose' booklet given: 08/05/15


Hx Alcohol Use: No


Drug/Substance Use Hx: No


Substance Use Type: Alcohol


Hx Substance Use Treatment: Yes (3 previous inpt detox & one rehab @ [facility])





Trauma Specific PMHX





- Complaint Specific PMHX


Arthritis: Yes





**Review of Systems





- Review of Systems


Constitutional: Yes: Fever.  No: Chills


HEENTM: No: Symptoms Reported, Eye Pain


Respiratory: No: Symptoms reported


Cardiac (ROS): Yes: Symptoms Reported


ABD/GI: Yes: Diarrhea, Vomiting


: No: Symptoms Reported, Dysuria, Discharge, Hematuria


Musculoskeletal: Yes: See HPI


Integumentary: Yes: See HPI





*Physical Exam





- Vital Signs


 Last Vital Signs











Temp Pulse Resp BP Pulse Ox


 


 100.5 F H  93 H  18   111/59   100 


 


 07/10/17 11:12  07/10/17 11:12  07/10/17 11:12  07/10/17 11:12  07/10/17 11:12














- Physical Exam


General Appearance: Yes: Alcohol on Breath, Intoxicated


HEENT: positive: Normal ENT Inspection, Lesions (right zygomatic process 3cm 

circular abrasion)


Neck: negative: Tender, Lymphadenopathy (R), Lymphadenopathy (L)


Respiratory/Chest: positive: Lungs Clear, Normal Breath Sounds.  negative: 

Respiratory Distress, Crackles, Rales, Rhonchi, Stridor, Wheezing


Cardiovascular: positive: Regular Rhythm, Regular Rate, S1, S2


Vascular Pulses: Carotid (R): 2+, Carotid (L): 2+, Dorsalis-Pedis (R): 2+, 

Doralis-Pedis (L): 2+


Gastrointestinal/Abdominal: positive: Protuberent, Other (scarrified wound with 

coagulated blood on RLQ)


Extremity: positive: Other


Integumentary: positive: Normal Color, Dry, Rash (raised erythmatous rash on R 

ventral forarm)


Neurologic: positive: Alert, Responsive, Disoriented





ED Treatment Course





- LABORATORY


CBC & Chemistry Diagram: 


 07/10/17 12:29





 07/10/17 12:29





Medical Decision Making





- Medical Decision Making


07/10/17 12:43


52yoM with pmh of Etoh abuse and DM2 presents to the ED after a fall asking for 

detox- rectal 102 - LOC possible according to pt, ordered CT head and R wrist 

and hand xray, cbc, cmp to r/o lytes and  hyper/hypoglycemia as well as etoh 

levels.


Pt put on banana bag.





07/10/17 13:18


Lytes back. Potassium 2.7 with low plts and 122HCl. Liver cirrhosis likely. 

Ordered 80meq KCl PO. Blood cultures ordered.


Patient to be admitted after imaging comes back.











07/10/17 14:21


Ct head came back negative. Dr. Stark paged for pt admission. Accepted 

admission. Need consult from Dr. Enriquez





07/10/17 14:54








07/10/17 16:54








07/10/17 16:57


oral temp 103. Ordered chest xray and tylenol iv





07/10/17 16:59








*DC/Admit/Observation/Transfer


Diagnosis at time of Disposition: 


 ETOH abuse, Hyponatremia, Hypokalemia





- Discharge Dispostion


Admit: Yes





- Transfer to Acute Care Facility


Accepting Physician:: Dr. Stark

## 2017-07-11 LAB
ALBUMIN SERPL-MCNC: 2.1 G/DL (ref 3.4–5)
ALP SERPL-CCNC: 130 U/L (ref 45–117)
ALT SERPL-CCNC: 41 U/L (ref 12–78)
ANION GAP SERPL CALC-SCNC: 8 MMOL/L (ref 8–16)
AST SERPL-CCNC: 120 U/L (ref 15–37)
BASOPHILS # BLD: 0.8 % (ref 0–2)
BILIRUB SERPL-MCNC: 1.6 MG/DL (ref 0.2–1)
CALCIUM SERPL-MCNC: 7.3 MG/DL (ref 8.5–10.1)
CO2 SERPL-SCNC: 28 MMOL/L (ref 21–32)
CREAT SERPL-MCNC: 0.5 MG/DL (ref 0.7–1.3)
DEPRECATED RDW RBC AUTO: 15.8 % (ref 11.9–15.9)
EOSINOPHIL # BLD: 0 % (ref 0–4.5)
GLUCOSE SERPL-MCNC: 163 MG/DL (ref 74–106)
MCH RBC QN AUTO: 31 PG (ref 25.7–33.7)
MCHC RBC AUTO-ENTMCNC: 34.8 G/DL (ref 32–35.9)
MCV RBC: 88.9 FL (ref 80–96)
NEUTROPHILS # BLD: 84.3 % (ref 42.8–82.8)
PLATELET # BLD AUTO: 53 K/MM3 (ref 134–434)
PMV BLD: 10 FL (ref 7.5–11.1)
PROT SERPL-MCNC: 6.9 G/DL (ref 6.4–8.2)
WBC # BLD AUTO: 7.7 K/MM3 (ref 4–10)

## 2017-07-11 RX ADMIN — Medication SCH MG: at 22:10

## 2017-07-11 RX ADMIN — CEFTRIAXONE SCH MLS/HR: 1 INJECTION, POWDER, FOR SOLUTION INTRAMUSCULAR; INTRAVENOUS at 11:44

## 2017-07-11 RX ADMIN — Medication SCH MG: at 11:45

## 2017-07-11 RX ADMIN — CEFEPIME HYDROCHLORIDE SCH GM: 1 INJECTION, POWDER, FOR SOLUTION INTRAMUSCULAR; INTRAVENOUS at 15:56

## 2017-07-11 RX ADMIN — MULTIVITAMIN TABLET SCH TAB: TABLET at 11:45

## 2017-07-11 RX ADMIN — SODIUM CHLORIDE SCH MLS/HR: 9 INJECTION, SOLUTION INTRAVENOUS at 15:58

## 2017-07-11 RX ADMIN — IBUPROFEN PRN MG: 400 TABLET, FILM COATED ORAL at 06:36

## 2017-07-11 RX ADMIN — POTASSIUM CHLORIDE SCH MEQ: 1500 TABLET, EXTENDED RELEASE ORAL at 19:08

## 2017-07-11 RX ADMIN — SODIUM CHLORIDE SCH: 9 INJECTION, SOLUTION INTRAVENOUS at 22:01

## 2017-07-11 RX ADMIN — IBUPROFEN PRN MG: 400 TABLET, FILM COATED ORAL at 22:10

## 2017-07-11 RX ADMIN — FOLIC ACID SCH MG: 1 TABLET ORAL at 11:45

## 2017-07-11 NOTE — PN
Progress Note, Physician


History of Present Illness: 


drowsy





- Current Medication List


Current Medications: 


Active Medications





Cefepime HCl (Maxipime 1gm Ivpb Pre-Docked)  1 gm IVPB Q8H KELI


   PRN Reason: Protocol


   Last Admin: 07/11/17 15:56 Dose:  1 gm


Chlordiazepoxide HCl (Librium -)  25 mg PO Q4H PRN


   PRN Reason: WITHDRAWAL(CONT SUBST)


   Stop: 07/13/17 15:22


Chlordiazepoxide HCl (Librium -)  25 mg PO [id number] [facility]


   Stop: 07/12/17 11:01


   Last Admin: 07/11/17 18:18 Dose:  25 mg


Chlordiazepoxide HCl (Librium -)  15 mg PO [id number] [facility]


   Stop: 07/13/17 11:01


Folic Acid (Folic Acid -)  1 mg PO DAILY [facility]


   Last Admin: 07/11/17 11:45 Dose:  1 mg


Sodium Chloride (Normal Saline -)  1,000 mls @ 75 mls/hr IV ASDIR [facility]


   Last Admin: 07/11/17 15:58 Dose:  75 mls/hr


Ibuprofen (Motrin -)  400 mg PO Q6H PRN


   PRN Reason: PAIN


   Last Admin: 07/11/17 06:36 Dose:  400 mg


Multivitamins/Minerals/Vitamin C (Tab-A-Vit -)  1 tab PO DAILY [facility]


   Last Admin: 07/11/17 11:45 Dose:  1 tab


Thiamine HCl (Vitamin B1 -)  100 mg PO BID [facility]


   Last Admin: 07/11/17 11:45 Dose:  100 mg











- Objective


Vital Signs: 


 Vital Signs











Temperature  97.8 F   07/11/17 15:33


 


Pulse Rate  89   07/11/17 15:33


 


Respiratory Rate  22   07/11/17 15:33


 


Blood Pressure  102/58   07/11/17 15:33


 


O2 Sat by Pulse Oximetry (%)  93 L  07/11/17 11:00











HENT: Yes: Atraumatic


Neck: Yes: Supple


Cardiovascular: Yes: Regular Rate and Rhythm


Respiratory: Yes: Rhonchi


Gastrointestinal: Yes: Normal Bowel Sounds


Extremities: Yes: WNL


Neurological: Yes: Other (drowsy due to librium)


Labs: 


 CBC, BMP





 07/11/17 07:35 





 07/11/17 07:35 











Problem List





- Problems


(1) ETOH abuse


Assessment/Plan: 


on librium protocol


Code(s): F10.10 - ALCOHOL ABUSE, UNCOMPLICATED





(2) Hypokalemia


Assessment/Plan: 


will replace





Code(s): E87.6 - HYPOKALEMIA





(3) Hyponatremia


Assessment/Plan: 


will monitor


Code(s): E87.1 - HYPO-OSMOLALITY AND HYPONATREMIA





(4) Alcohol dependence with uncomplicated withdrawal


Assessment/Plan: 


pen ativan


detox consult


Code(s): F10.230 - ALCOHOL DEPENDENCE WITH WITHDRAWAL, UNCOMPLICATED





(5) Alcoholic liver disease


Code(s): K70.9 - ALCOHOLIC LIVER DISEASE, UNSPECIFIED





(6) DM Diabetes mellitus type 2


Code(s): E11.9 - TYPE 2 DIABETES MELLITUS WITHOUT COMPLICATIONS





(7) HTN (hypertension)


Code(s): I10 - ESSENTIAL (PRIMARY) HYPERTENSION   





(8) Left upper lobe pneumonia


Assessment/Plan: 


on iv abx p[er id


Code(s): J18.1 - LOBAR PNEUMONIA, UNSPECIFIED ORGANISM

## 2017-07-11 NOTE — CONSULT
Consult


Consult Specialty:: infectious diseases


Reason for Consultation:: fever,asp pna





- History of Present Illness


Chief Complaint: fever,


History of Present Illness: 


52-year-old male with history of diabetes and alcohol abuse Presents with 

mechanical fall. The patient reported that he had several drinks of beer and he 

fell. Reports that he struck the right side of his face and thinks may have had 

a brief loss of conscious. Patient denies headache, nausea or vomiting. Reports 

feeling somewhat intoxicated. Patient does report that lately he had some few 

loose stools and nausea and vomiting. Denies abdominal pain at this time. 

Temperature is noted 100.5 here.


the above history taken from the charts as patient is out of it and drowsy as 

he has also been given librium


patient answers briefly and then falls into sleep


patient has been having fevers and has gone as high as 103


rest no information available at this time





- History Source


History Provided By: Medical Record


Limitations to Obtaining History: Clinical Condition





- Alcohol/Substance Use


Hx Alcohol Use: Yes





- Smoking History


Smoking history: Never smoked


Have you smoked in the past 12 months: No


Aproximately how many cigarettes per day: 0





Home Medications





- Allergies


Allergies/Adverse Reactions: 


 Allergies











Allergy/AdvReac Type Severity Reaction Status Date / Time


 


No Known Allergies Allergy   Verified 07/10/17 11:29














- Home Medications


Home Medications: 


Ambulatory Orders





Enalapril Maleate [Vasotec -] 5 mg PO DAILY #30 tablet 05/22/17 


Metformin HCl [Glucophage -] 1,000 mg PO BID@0700,1630 #60 tablet 05/22/17 











Family Disease History





- Family Disease History


Family Disease History: Other: Father (ETOH DEPENDENCE )





Review of Systems


Unable to obtain ROS, reason: unable to obtain





- Review of Systems


Constitutional: reports: Fever





Physical Exam


Vital Signs: 


 Vital Signs











Temperature  100.1 F H  07/11/17 06:00


 


Pulse Rate  85   07/11/17 11:12


 


Respiratory Rate  24   07/11/17 11:12


 


Blood Pressure  104/50   07/11/17 11:12


 


O2 Sat by Pulse Oximetry (%)  95   07/10/17 21:00











Constitutional: Yes: Well Nourished, Obese


Eyes: Yes: Conjunctiva Clear


HENT: Yes: Other (foul smelling oral cavity)


Cardiovascular: Yes: Regular Rate and Rhythm


Respiratory: Yes: Regular, Poor Air Entry (rt side)


Gastrointestinal: Yes: Normal Bowel Sounds, Soft


Musculoskeletal: Yes: WNL


Extremities: Yes: WNL


Neurological: Yes: Alert, Oriented


Labs: 


 CBC, BMP





 07/11/17 07:35 





 07/11/17 07:35 











Imaging





- Results


Chest X-ray: Report Reviewed, Image Reviewed


X-ray: Report Reviewed, Image Reviewed


Cat Scan: Report Reviewed, Image Reviewed





Assessment/Plan


 Problem List 





- Problems


(1) ETOH abuse


Code(s): F10.10 - ALCOHOL ABUSE, UNCOMPLICATED





(2) Hypokalemia


Code(s): E87.6 - HYPOKALEMIA





(3) Hyponatremia


Code(s): E87.1 - HYPO-OSMOLALITY AND HYPONATREMIA





(4) Alcohol dependence with uncomplicated withdrawal


Code(s): F10.230 - ALCOHOL DEPENDENCE WITH WITHDRAWAL, UNCOMPLICATED





5 fever





6 asp pna





looking at the xray i think patient has aspirated





plan


will switch patient to cefipime


continue to monitor for with drawl


hydration


rest as per detox

## 2017-07-12 LAB
ALBUMIN SERPL-MCNC: 1.8 G/DL (ref 3.4–5)
ALP SERPL-CCNC: 151 U/L (ref 45–117)
ALT SERPL-CCNC: 39 U/L (ref 12–78)
ANION GAP SERPL CALC-SCNC: 10 MMOL/L (ref 8–16)
ART PUNCT SITE: (no result)
ARTERIAL PATENCY WRIST A: POSITIVE
AST SERPL-CCNC: 98 U/L (ref 15–37)
BASE EXCESS BLDA CALC-SCNC: 2.1 MEQ/L (ref -2–2)
BILIRUB SERPL-MCNC: 1.8 MG/DL (ref 0.2–1)
CALCIUM SERPL-MCNC: 7.3 MG/DL (ref 8.5–10.1)
CO2 SERPL-SCNC: 25 MMOL/L (ref 21–32)
CREAT SERPL-MCNC: 0.6 MG/DL (ref 0.7–1.3)
DEPRECATED RDW RBC AUTO: 16.5 % (ref 11.9–15.9)
GLUCOSE SERPL-MCNC: 226 MG/DL (ref 74–106)
HCO3 BLDA-SCNC: 25.4 MEQ/L (ref 22–26)
LPM/O2%: (no result)
MCH RBC QN AUTO: 30.3 PG (ref 25.7–33.7)
MCHC RBC AUTO-ENTMCNC: 33.2 G/DL (ref 32–35.9)
MCV RBC: 91.3 FL (ref 80–96)
PEEP SETTING VENT: 0 CMH2O
PLATELET # BLD AUTO: 73 K/MM3 (ref 134–434)
PMV BLD: 11.5 FL (ref 7.5–11.1)
PO2 BLDA: 70.4 MMHG (ref 80–100)
PROT SERPL-MCNC: 6.4 G/DL (ref 6.4–8.2)
SAO2 % BLDA: 95.8 % (ref 90–98.9)
WBC # BLD AUTO: 8.5 K/MM3 (ref 4–10)

## 2017-07-12 RX ADMIN — CEFEPIME HYDROCHLORIDE SCH GM: 1 INJECTION, POWDER, FOR SOLUTION INTRAMUSCULAR; INTRAVENOUS at 23:33

## 2017-07-12 RX ADMIN — Medication SCH MG: at 21:12

## 2017-07-12 RX ADMIN — MULTIVITAMIN TABLET SCH TAB: TABLET at 09:26

## 2017-07-12 RX ADMIN — CLINDAMYCIN PHOSPHATE SCH MLS/HR: 300 INJECTION, SOLUTION INTRAVENOUS at 16:55

## 2017-07-12 RX ADMIN — Medication SCH MG: at 09:26

## 2017-07-12 RX ADMIN — IBUPROFEN PRN MG: 400 TABLET, FILM COATED ORAL at 11:36

## 2017-07-12 RX ADMIN — CEFEPIME HYDROCHLORIDE SCH GM: 1 INJECTION, POWDER, FOR SOLUTION INTRAMUSCULAR; INTRAVENOUS at 00:32

## 2017-07-12 RX ADMIN — CLINDAMYCIN PHOSPHATE SCH: 300 INJECTION, SOLUTION INTRAVENOUS at 18:04

## 2017-07-12 RX ADMIN — IPRATROPIUM BROMIDE AND ALBUTEROL SULFATE SCH AMP: .5; 3 SOLUTION RESPIRATORY (INHALATION) at 23:13

## 2017-07-12 RX ADMIN — SODIUM CHLORIDE SCH MLS/HR: 9 INJECTION, SOLUTION INTRAVENOUS at 14:49

## 2017-07-12 RX ADMIN — CEFEPIME HYDROCHLORIDE SCH: 1 INJECTION, POWDER, FOR SOLUTION INTRAMUSCULAR; INTRAVENOUS at 17:17

## 2017-07-12 RX ADMIN — CEFEPIME HYDROCHLORIDE SCH GM: 1 INJECTION, POWDER, FOR SOLUTION INTRAMUSCULAR; INTRAVENOUS at 17:21

## 2017-07-12 RX ADMIN — POTASSIUM CHLORIDE SCH MEQ: 1500 TABLET, EXTENDED RELEASE ORAL at 09:26

## 2017-07-12 RX ADMIN — FOLIC ACID SCH MG: 1 TABLET ORAL at 09:26

## 2017-07-12 RX ADMIN — SODIUM CHLORIDE SCH MLS/HR: 9 INJECTION, SOLUTION INTRAVENOUS at 23:33

## 2017-07-12 RX ADMIN — CEFEPIME HYDROCHLORIDE SCH GM: 1 INJECTION, POWDER, FOR SOLUTION INTRAMUSCULAR; INTRAVENOUS at 09:20

## 2017-07-12 RX ADMIN — AZITHROMYCIN DIHYDRATE SCH MG: 500 INJECTION, POWDER, LYOPHILIZED, FOR SOLUTION INTRAVENOUS at 18:10

## 2017-07-12 RX ADMIN — CLINDAMYCIN PHOSPHATE SCH: 300 INJECTION, SOLUTION INTRAVENOUS at 15:00

## 2017-07-12 RX ADMIN — IPRATROPIUM BROMIDE AND ALBUTEROL SULFATE SCH AMP: .5; 3 SOLUTION RESPIRATORY (INHALATION) at 18:15

## 2017-07-12 NOTE — PN
Teaching Attending Note


Name of Resident: Miguel Alcantara


ATTENDING PHYSICIAN STATEMENT





I saw and evaluated the patient.


I reviewed the resident's note and discussed the case with the resident.


I agree with the resident's findings and plan as documented.








PULMONARY








IMP ACUTE HYPOXEMIC RESPIRATORY FAILURE


      SEPSIS


      PNEUMONIA  CAP,?ASPIRATION


      HYPONATREMIA  


      ETOH


      ELEVATED LFTS


      S/P FALL





PLAN ANTIBIOTICS


        CULTURES


        IVF


        CHEST CT


        INHALED BRONCHODILATORS


         STEROIDS X 24HRS


         LEGIONELLA URINARY ANTIGEN


         MONITORS LYTES/NA,LFTS


         LIBRIUM


         


        


         





DR REYEZ


      


      





Problem List





- Problems


(1) ETOH abuse


Code(s): F10.10 - ALCOHOL ABUSE, UNCOMPLICATED





(2) Head trauma


Code(s): S09.90XA - UNSPECIFIED INJURY OF HEAD, INITIAL ENCOUNTER   





(3) Hypokalemia


Code(s): E87.6 - HYPOKALEMIA





(4) Hyponatremia


Code(s): E87.1 - HYPO-OSMOLALITY AND HYPONATREMIA





(5) Left upper lobe pneumonia


Code(s): J18.1 - LOBAR PNEUMONIA, UNSPECIFIED ORGANISM   





(6) Sepsis associated hypotension


Code(s): A41.9 - SEPSIS, UNSPECIFIED ORGANISM





(7) Alcohol dependence with uncomplicated withdrawal


Code(s): F10.230 - ALCOHOL DEPENDENCE WITH WITHDRAWAL, UNCOMPLICATED





(8) Hand abrasion


Code(s): S60.519A - ABRASION OF UNSPECIFIED HAND, INITIAL ENCOUNTER   Qualifiers

: 


     Laterality: right





(9) Alcoholic liver disease


Code(s): K70.9 - ALCOHOLIC LIVER DISEASE, UNSPECIFIED





(10) DM Diabetes mellitus type 2


Code(s): E11.9 - TYPE 2 DIABETES MELLITUS WITHOUT COMPLICATIONS





(11) Acute hypoxemic respiratory failure


Code(s): J96.01 - ACUTE RESPIRATORY FAILURE WITH HYPOXIA

## 2017-07-12 NOTE — PN
Progress Note, Physician


History of Present Illness: 


AWAKE





- Current Medication List


Current Medications: 


Active Medications





Albuterol Sulfate (Ventolin 0.083% Nebulizer Soln -)  1 amp NEB Q4H PRN


   PRN Reason: SHORT OF BREATH/WHEEZING


   Last Admin: 07/12/17 08:51 Dose:  1 amp


Albuterol/Ipratropium (Duoneb -)  1 amp NEB QIDR KELI


Cefepime HCl (Maxipime 1gm Ivpb Pre-Docked)  1 gm IVPB Q8H KELI


   PRN Reason: Protocol


   Last Admin: 07/12/17 09:20 Dose:  1 gm


Chlordiazepoxide HCl (Librium -)  25 mg PO Q4H PRN


   PRN Reason: WITHDRAWAL(CONT SUBST)


   Stop: 07/13/17 15:22


Chlordiazepoxide HCl (Librium -)  15 mg PO [id number] KELI


   Stop: 07/13/17 11:01


Folic Acid (Folic Acid -)  1 mg PO DAILY KELI


   Last Admin: 07/12/17 09:26 Dose:  1 mg


Sodium Chloride (Normal Saline -)  1,000 mls @ 100 mls/hr IV ASDIR KELI


   Last Admin: 07/12/17 14:49 Dose:  100 mls/hr


Clindamycin Phosphate (Cleocin 300 Mg Premix Ivpb)  50 mls @ 100 mls/hr IVPB Q8H

-IV KELI


   Last Admin: 07/12/17 16:12 Dose:  100 mls/hr


Ibuprofen (Motrin -)  400 mg PO Q6H PRN


   PRN Reason: PAIN


   Last Admin: 07/12/17 11:36 Dose:  400 mg


Multivitamins/Minerals/Vitamin C (Tab-A-Vit -)  1 tab PO DAILY KELI


   Last Admin: 07/12/17 09:26 Dose:  1 tab


Potassium Chloride (K-Dur -)  40 meq PO DAILY KELI


   Last Admin: 07/12/17 09:26 Dose:  40 meq


Thiamine HCl (Vitamin B1 -)  100 mg PO BID KELI


   Last Admin: 07/12/17 09:26 Dose:  100 mg











- Objective


Vital Signs: 


 Vital Signs











Temperature  98.6 F   07/12/17 16:14


 


Pulse Rate  76   07/12/17 16:14


 


Respiratory Rate  32 H  07/12/17 16:14


 


Blood Pressure  95/60   07/12/17 16:14


 


O2 Sat by Pulse Oximetry (%)  93 L  07/12/17 11:00











Constitutional: Yes: No Distress


HENT: Yes: Atraumatic


Neck: Yes: Supple


Cardiovascular: Yes: Regular Rate and Rhythm


Respiratory: Yes: Rhonchi


Gastrointestinal: Yes: Normal Bowel Sounds


Extremities: Yes: WNL


Neurological: Yes: Alert


Labs: 


 CBC, BMP





 07/12/17 09:45 





 07/12/17 09:45 











Problem List





- Problems


(1) ETOH abuse


Assessment/Plan: 


on librium protocol


Code(s): F10.10 - ALCOHOL ABUSE, UNCOMPLICATED





(2) Hypokalemia


Assessment/Plan: 


 replaced





Code(s): E87.6 - HYPOKALEMIA





(3) Hyponatremia


Assessment/Plan: 


resolving


Code(s): E87.1 - HYPO-OSMOLALITY AND HYPONATREMIA





(4) Alcohol dependence with uncomplicated withdrawal


Assessment/Plan: 


prn ativan


detox consult


Code(s): F10.230 - ALCOHOL DEPENDENCE WITH WITHDRAWAL, UNCOMPLICATED





(5) Alcoholic liver disease


Code(s): K70.9 - ALCOHOLIC LIVER DISEASE, UNSPECIFIED





(6) DM Diabetes mellitus type 2


Code(s): E11.9 - TYPE 2 DIABETES MELLITUS WITHOUT COMPLICATIONS





(7) HTN (hypertension)


Code(s): I10 - ESSENTIAL (PRIMARY) HYPERTENSION   





(8) Left upper lobe pneumonia


Assessment/Plan: 


on iv abx p[er id


Code(s): J18.1 - LOBAR PNEUMONIA, UNSPECIFIED ORGANISM   








Assessment/Plan


 





ABG DONE


WILL MONITOR


PRN OXYGEN


SMITH PLACED TO MONITOR URINE OUT PUT

## 2017-07-12 NOTE — PN
Progress Note (short form)





- Note


Progress Note: 


Pulm/CCM





Pt seen earlier today by Dr Jeffery. Follow up at request of Dr Wan out of 

concern for mental status and soft BP requiring fluid





CC: cough





HPI: Briefly Mr Hoffman is a 52-year-old male with history of diabetes and 

alcohol abuse Presents with mechanical fall out of bed, striking head but no 

LOC after significant ETOH use. Inital workup: CT head negatve. Blood ETOH 153. 

Cxr with LLL infiltrate, started on abx. Concern for ETOH withdrawal prompted 

starting librium, on thiamine/folate as well. Pt became more hypotensive and 

somulent this morning, Librium was held and some fluid 2L was given.  ID 

following (on clinda, cefepime, azith). Pulm following for hypoxia requiring 3-

4 L NC and infiltrate. No new fever. 





 Current Medications











Generic Name Dose Route Start Last Admin





  Trade Name Freq  PRN Reason Stop Dose Admin


 


Albuterol Sulfate  1 amp 07/12/17 08:27 07/12/17 08:51





  Ventolin 0.083% Nebulizer Soln -  NEB   1 amp





  Q4H PRN   Administration





  SHORT OF BREATH/WHEEZING   


 


Albuterol/Ipratropium  1 amp 07/12/17 18:00 07/12/17 18:15





  Duoneb -  NEB   1 amp





  QIDR KELI   Administration


 


Azithromycin  500 mg 07/12/17 18:00 07/12/17 18:10





  Zithromax 500mg Ivpb (Pre-Docked)  IVPB   500 mg





  DAILY KELI   Administration


 


Cefepime HCl  1 gm 07/11/17 16:00 07/12/17 17:21





  Maxipime 1gm Ivpb Pre-Docked  IVPB   1 gm





  Q8H KELI   Administration





  Protocol   


 


Chlordiazepoxide HCl  25 mg 07/10/17 15:23  





  Librium -  PO 07/13/17 15:22  





  Q4H PRN   





  WITHDRAWAL(CONT SUBST)   


 


Folic Acid  1 mg 07/11/17 10:00 07/12/17 09:26





  Folic Acid -  PO   1 mg





  DAILY KELI   Administration


 


Sodium Chloride  1,000 mls @ 100 mls/hr 07/12/17 14:00 07/12/17 14:49





  Normal Saline -  IV   100 mls/hr





  ASDIR KELI   Administration


 


Clindamycin Phosphate  50 mls @ 100 mls/hr 07/12/17 14:30 07/12/17 18:04





  Cleocin 300 Mg Premix Ivpb  IVPB   Not Given





  Q8H-IV KELI   


 


Ibuprofen  400 mg 07/10/17 20:46 07/12/17 11:36





  Motrin -  PO   400 mg





  Q6H PRN   Administration





  PAIN   


 


Multivitamins/Minerals/Vitamin C  1 tab 07/11/17 10:00 07/12/17 09:26





  Tab-A-Vit -  PO   1 tab





  DAILY KELI   Administration


 


Potassium Chloride  40 meq 07/11/17 18:30 07/12/17 09:26





  K-Dur -  PO   40 meq





  DAILY KELI   Administration


 


Thiamine HCl  100 mg 07/10/17 22:00 07/12/17 21:12





  Vitamin B1 -  PO   100 mg





  BID KELI   Administration











On exam pt for me (1030pm) is awake, alert to person, hospital, (not date). Is 

able to follow simple commands. Pt c/o non-productive cough but denies SOB, cxpn

, N/v/d, fever, chills, rigors.


Manual BP 98/60, HR 86, RR 18 non-labored, spo2 97% on 3L.


HEENT: NCAT, PERRL


PULM: coarse crackles L>R , non productive cough


CV: RRR, no m/r/g


ABD: soft, NT, ND, +BS


EXT: trace edema





 CBC, BMP





 07/12/17 09:45 





 07/12/17 09:45 





labs reviewed


CXR reviewed , + LLL infiltrate





Pt CIWA-AR < 8 (3), does not appear in acute ETOH withdrawal





 


A/ 53 y/o man with acute etoh intoxication c/b fall, found to have LLL 

infiltrate c/w aspiration pna








P/


-cont current abx 


-if not worsening hypoxic or dyspnea, can cont with fluid resuscitation as 

needed


-repeat CXR ordered, if worsening would get CT chest, suspect aspiration pna


-attempt sputum cxr if possible. 


-nebs as needed


-cont to hold librium, not tremulous


-can maintain on floor 


- cont reorienting pt, high risk for delerium





[facility]


[id number]

## 2017-07-12 NOTE — CONSULT
Consultation: 


REQUESTING PROVIDER:





CONSULT REQUEST: We have been asked to medically evaluate this patient for 

pneumonia.





HISTORY OF PRESENT ILLNESS:


53 yo man w/ PMH of DM2 and alcohol abuse presents after mechanical fall while 

intoxicated, now with fever, cough and worsening respiratory status. Pt 

presented to the EDPt reports feeling feverish 2 days prior to admission and 

endorses a productive cough, but was unable to provide a clear timeline due to 

MS. States that he has been coughing up a moderate amount of green phlegm 

intermittently. Upon presentation to the ED, Pt was found with severe lyte 

abnormalities and was managed for hypoK/hypoNa. He was tachycardic (103), 

tachypneic (RR 32), and hypotensive (98/55), possible due to dehydration from 

diarrhea/vomiting. He denies any sick contacts and any prior Hx of CV or 

pulmonary conditions.  Denies palpitations, HA, abdominal pain, congestion or 

throat pain. Endorse N/V and diarrhea in ED, as well as LE edema. 





Pt currently on a librium taper for EtOH withdrawal w/ abx coverage for 

suspected PNA, w/ suspicion for aspiration. Per nursing, pt was initially 

desatting on NC and was escalated to venti mask 50L 40% overnight. Sats in 

lower 90% on NC during AM, but tends to desat at night. Concern for sepsis 

given increased RR, temp and possible PNA. 








PMHx


DM2


Alcohol abuse





PSHx


Unknown





Allergies


NKDA





Fam Hx


Father with EtOH dependence 





Social Hx


Denies alcohol abuse. No Hx of smoking. No drugs.








REVIEW OF SYSTEMS:


CONSTITUTIONAL: Fever, malaise


Absent:  fever, chills, diaphoresis, generalized weakness, malaise, loss of 

appetite, weight change


HEENT: 


Absent:  rhinorrhea, nasal congestion, mouth swelling, ear pain, 


CARDIOVASCULAR: peripheral edema


Absent: chest pain, palpitations, lightheadedness


RESPIRATORY: Productive cough, wheezing


Absent: shortness of breath, dyspnea with exertion, hemoptysis


GASTROINTESTINAL: diarrhea, N/V


Absent: abdominal pain, abdominal distension


Genitourinary:


Absent: dysuria, frequency, urgency, hesitancy, hematuria, flank pain, genital 

pain


MUSCULOSKELETAL: Back Pain


Absent: neck pain


SKIN: 


Absent: rash


NEUROLOGIC: 


Absent: headache





PHYSICAL EXAMINATION





 Vital Signs - 24 hr











  07/11/17 07/11/17 07/11/17





  11:12 15:33 18:00


 


Temperature  97.8 F 99.1 F


 


Pulse Rate 85 89 109 H


 


Respiratory 24 22 18





Rate   


 


Blood Pressure 104/50 102/58 101/61


 


O2 Sat by Pulse   





Oximetry (%)   














  07/11/17 07/11/17 07/11/17





  21:00 22:04 22:09


 


Temperature  99.8 F H 102.1 F H


 


Pulse Rate  108 H 


 


Respiratory  22 





Rate   


 


Blood Pressure  125/66 


 


O2 Sat by Pulse 93 L  





Oximetry (%)   














  07/12/17 07/12/17 07/12/17





  02:00 06:00 07:45


 


Temperature 98.9 F 100.1 F H 98.7 F


 


Pulse Rate 87 104 H 96 H


 


Respiratory 20 20 32 H





Rate   


 


Blood Pressure 113/41 109/65 96/50


 


O2 Sat by Pulse   





Oximetry (%)   














  07/12/17





  08:35


 


Temperature 


 


Pulse Rate 101 H


 


Respiratory 





Rate 


 


Blood Pressure 


 


O2 Sat by Pulse 93 L





Oximetry (%) 














GENERAL: Somnolent, waxing and waning MS. In no acute distress.


HEAD: Normal with no signs of trauma. Hot to touch. 


EYES: Pupils equal, round and reactive to light, extraocular movements intact, 

sclera icteric, conjunctiva clear. No lid lag.


EARS, NOSE, THROAT: Ears normal, nares patent, oropharynx clear without 

exudates. Moist mucous membranes.


NECK: Significant laryngeal adiposity. No lymphadenopathy, JVD, or masses.


LUNGS: BL rhonchi across all lung fields. Significant inspiratory/expiratory 

wheezes bilaterally across all field, more prominent in superior lung field. No 

accessory muscle use.


HEART: Regular rate and rhythm, normal S1 and S2. Possible grade II/VI blowing 

systolic murmur at LUSB. PMI not displaced. No clicks or rubs. 


ABDOMEN: Soft, nontender, distended, normoactive bowel sounds, no guarding, no 

rebound, no masses. Possible ascites. No splenomegaly. Liver span decreased. 


MUSCULOSKELETAL: Normal range of motion at all joints. No bony deformities or 

tenderness. No CVA tenderness.


UPPER EXTREMITIES: 2+ pulses, warm, well-perfused. No cyanosis. No clubbing. No 

peripheral edema.


LOWER EXTREMITIES: 2+ pulses, warm, well-perfused. No calf tenderness. 1+ BL 

edema. 


NEUROLOGICAL: Slow, mumbled speech. Gait not evaluated.


PSYCHIATRIC: Cooperative. Intermittent eye contact. Somnolent and 

intermittently directable. 


SKIN: Warm, dry, normal turgor, no rashes or lesions noted. 











 Laboratory Results - last 24 hr











  07/12/17 07/12/17





  05:45 09:45


 


Sodium   131 L


 


Potassium   3.6


 


Chloride   96 L


 


Carbon Dioxide   25


 


Anion Gap   10


 


BUN   8  D


 


Creatinine   0.6 L


 


Creat Clearance w eGFR   > 60


 


POC Glucometer  134 


 


Random Glucose   226 H D


 


Calcium   7.3 L


 


Total Bilirubin   1.8 H


 


AST   98 H


 


ALT   39


 


Alkaline Phosphatase   151 H


 


Total Protein   6.4


 


Albumin   1.8 L








 Laboratory Tests











  07/10/17 07/10/17 07/10/17





  12:29 12:29 12:29


 


WBC  7.9  


 


RBC  3.93 L  


 


Hgb  12.2  


 


Hct  34.8 L  


 


MCV  88.6  


 


MCH  31.1  


 


MCHC  35.2  


 


RDW  15.3  D  


 


Plt Count  53 L D  


 


MPV  10.1  


 


Neutrophils %  77.9  D  


 


Lymphocytes %  10.6  D  


 


Monocytes %  11.1 H  


 


Eosinophils %  0.0  D  


 


Basophils %  0.4  


 


Sodium   119 L* D 


 


Potassium   2.7 L* D 


 


Chloride   83 L D 


 


Carbon Dioxide   26 


 


Anion Gap   10 


 


BUN   6 L 


 


Creatinine   0.8  D 


 


Creat Clearance w eGFR   > 60 


 


POC Glucometer   


 


Random Glucose   158 H 


 


Calcium   7.2 L 


 


Total Bilirubin   1.6 H D 


 


AST   122 H D 


 


ALT   41  D 


 


Alkaline Phosphatase   146 H D 


 


Total Protein   7.3 


 


Albumin   2.3 L 


 


Alcohol, Quantitative    153.8 H*














  07/11/17 07/11/17 07/12/17





  07:35 07:35 05:45


 


WBC  7.7  


 


RBC  4.06  


 


Hgb  12.6  


 


Hct  36.1  


 


MCV  88.9  


 


MCH  31.0  


 


MCHC  34.8  


 


RDW  15.8  


 


Plt Count  53 L  


 


MPV  10.0  


 


Neutrophils %  84.3 H  


 


Lymphocytes %  7.7 L D  


 


Monocytes %  7.2  


 


Eosinophils %  0.0  


 


Basophils %  0.8  


 


Sodium   125 L 


 


Potassium   3.0 L 


 


Chloride   89 L 


 


Carbon Dioxide   28 


 


Anion Gap   8 


 


BUN   5 L 


 


Creatinine   0.5 L D 


 


Creat Clearance w eGFR   > 60 


 


POC Glucometer    134


 


Random Glucose   163 H 


 


Calcium   7.3 L 


 


Total Bilirubin   1.6 H 


 


AST   120 H 


 


ALT   41 


 


Alkaline Phosphatase   130 H 


 


Total Protein   6.9 


 


Albumin   2.1 L 


 


Alcohol, Quantitative   














  07/12/17





  09:45


 


WBC 


 


RBC 


 


Hgb 


 


Hct 


 


MCV 


 


MCH 


 


MCHC 


 


RDW 


 


Plt Count 


 


MPV 


 


Neutrophils % 


 


Lymphocytes % 


 


Monocytes % 


 


Eosinophils % 


 


Basophils % 


 


Sodium  131 L


 


Potassium  3.6


 


Chloride  96 L


 


Carbon Dioxide  25


 


Anion Gap  10


 


BUN  8  D


 


Creatinine  0.6 L


 


Creat Clearance w eGFR  > 60


 


POC Glucometer 


 


Random Glucose  226 H D


 


Calcium  7.3 L


 


Total Bilirubin  1.8 H


 


AST  98 H


 


ALT  39


 


Alkaline Phosphatase  151 H


 


Total Protein  6.4


 


Albumin  1.8 L


 


Alcohol, Quantitative 

















Micro: 


Blood Culture x2 pending





CXR: (7/11 - my read) - AP lordotic. No prior films. Possible cardiomegaly. 

Prominent diffuse opacity throughout L mid and lower lung fields, suggestive of 

possible consolidation. Consider CT scan per radiology recommendation. 





Active Medications











Generic Name Dose Route Start Last Admin





  Trade Name Freq  PRN Reason Stop Dose Admin


 


Albuterol Sulfate  1 amp 07/12/17 08:27 07/12/17 08:51





  Ventolin 0.083% Nebulizer Soln -  NEB   1 amp





  Q4H PRN   Administration





  SHORT OF BREATH/WHEEZING   


 


Cefepime HCl  1 gm 07/11/17 16:00 07/12/17 09:20





  Maxipime 1gm Ivpb Pre-Docked  IVPB   1 gm





  Q8H KELI   Administration





  Protocol   


 


Chlordiazepoxide HCl  25 mg 07/10/17 15:23  





  Librium -  PO 07/13/17 15:22  





  Q4H PRN   





  WITHDRAWAL(CONT SUBST)   


 


Chlordiazepoxide HCl  15 mg 07/12/17 17:00  





  Librium -  PO 07/13/17 11:01  





  Y2V-YIT KELI   


 


Folic Acid  1 mg 07/11/17 10:00 07/12/17 09:26





  Folic Acid -  PO   1 mg





  DAILY KELI   Administration


 


Sodium Chloride  1,000 mls @ 75 mls/hr 07/10/17 20:45 07/11/17 22:01





  Normal Saline -  IV   Not Given





  ASDIR KELI   


 


Ibuprofen  400 mg 07/10/17 20:46 07/11/17 22:10





  Motrin -  PO   400 mg





  Q6H PRN   Administration





  PAIN   


 


Multivitamins/Minerals/Vitamin C  1 tab 07/11/17 10:00 07/12/17 09:26





  Tab-A-Vit -  PO   1 tab





  DAILY KELI   Administration


 


Potassium Chloride  40 meq 07/11/17 18:30 07/12/17 09:26





  K-Dur -  PO   40 meq





  DAILY KELI   Administration


 


Thiamine HCl  100 mg 07/10/17 22:00 07/12/17 09:26





  Vitamin B1 -  PO   100 mg





  BID KELI   Administration











ASSESSMENT/PLAN:





Assessment: 53 yo man w/ PMH of DM2 and alcohol abuse presents after mechanical 

fall during suspect episode of intoxication, now with fever, cough and 

worsening respiratory status. PE notable for BL diffuse rhonchi and inspiratory/

exspiratory wheeze, calor, and scleral icterus. CXR notable for KENNETH 

consolidation. Critical to differentiate infectious sxs vs. withdrawal sxs. Pt'

s clinical condition consisted with PNA/sepsis possibly secondary to aspiration 

during intoxication. Pt meets 3/4 SIRS criteria and is hypotensive, concerning 

for sepsis. Possible RITCHIE given body habitus, contributing to desat during PM.  





Plan:





#Hypoxemic respiratory failure


- Monitor O2 sats on NC. Escalate to Venti mask if hypoxic.


- Consider CPAP in PM w/ possible RITCHIE


- Brochodilators





#PNA/Sepsis


- CT scan for further eval of consolidation/lung parenchyma


- Sputum Cx, Urine Cx


- Legionella Ag, Strep Pneumo Ag


- Trend fever, WBC


- IV hydration. Monitor volume status


- Lactate


- Current Abx Cefepime, clindamycin for CAP coverage. 


- I&Os, MAPs


- Monitor MS





#Suspected RITCHIE


- Sleep Study





Miguel Alcantara MD, PGY1 





Will discuss with attending, Dr. Jeffery





Dispo: We will continue to follow the patient. Thank you for this consultative 

opportunity.











Problem List





- Problems


(1) Left upper lobe pneumonia


Code(s): J18.1 - LOBAR PNEUMONIA, UNSPECIFIED ORGANISM   





(2) Alcohol dependence with uncomplicated withdrawal


Code(s): F10.230 - ALCOHOL DEPENDENCE WITH WITHDRAWAL, UNCOMPLICATED





(3) Sepsis associated hypotension


Code(s): A41.9 - SEPSIS, UNSPECIFIED ORGANISM








Visit type





- Emergency Visit


Emergency Visit: No





- New Patient


This patient is new to me today: Yes


Date on this admission: 07/12/17





- Critical Care


Critical Care patient: No

## 2017-07-12 NOTE — EKG
Test Reason : 

Blood Pressure : ***/*** mmHG

Vent. Rate : 092 BPM     Atrial Rate : 092 BPM

   P-R Int : 146 ms          QRS Dur : 102 ms

    QT Int : 426 ms       P-R-T Axes : [phone] degrees

   QTc Int : 526 ms

 

NORMAL SINUS RHYTHM

PROLONGED QT

ABNORMAL ECG

WHEN COMPARED WITH ECG OF 20-APR-2017 21:59,

QT HAS LENGTHENED

Confirmed by FANTASMA LEI, FAZAL ([id number]) on 7/12/2017 11:50:48 AM

 

Referred By:             Confirmed By:FAZAL MEEK MD

## 2017-07-12 NOTE — CON.NEURO
Consult


Consult Specialty:: neurology





- History of Present Illness


History of Present Illness: 


52-year-old male with history of diabetes and alcohol abuse Presents with 

mechanical fall. The patient reported that he had several drinks of beer and he 

fell. 


? if he feel out of bed; denies HA or focal c/o.  Patient denies headache, 

nausea or vomiting.  Patient does report that lately he had some few loose 

stools and nausea and vomiting. Denies abdominal pain at this time. drinks 6-76 

drinks/day.,, last drink yesterday; lives with sister.


+ temp noted . pt not oriented to date--poor HX. 


CT HD no acute pathology, mild MVD, and atrophy , no bleed. 











- Past Medical History


Endocrine: Yes: Diabetes Mellitus





- Alcohol/Substance Use


Hx Alcohol Use: Yes





- Smoking History


Smoking history: Never smoked


Have you smoked in the past 12 months: No


Aproximately how many cigarettes per day: 0





Home Medications





- Allergies


Allergies/Adverse Reactions: 


 Allergies











Allergy/AdvReac Type Severity Reaction Status Date / Time


 


No Known Allergies Allergy   Verified 07/10/17 11:29














- Home Medications


Home Medications: 


Ambulatory Orders





Enalapril Maleate [Vasotec -] 5 mg PO DAILY #30 tablet 05/22/17 


Metformin HCl [Glucophage -] 1,000 mg PO BID@0700,1630 #60 tablet 05/22/17 











Family Disease History





- Family Disease History


Family Disease History: Other: Father (ETOH DEPENDENCE )





Physical Exam-Neuro


Vital Signs: 


 Vital Signs











Temperature  100.1 F H  07/12/17 06:00


 


Pulse Rate  101 H  07/12/17 08:35


 


Respiratory Rate  20   07/12/17 06:00


 


Blood Pressure  109/65   07/12/17 06:00


 


O2 Sat by Pulse Oximetry (%)  93 L  07/12/17 08:35











Constitutional: Yes: No Distress


Labs: 


 CBC, BMP





 07/11/17 07:35 





 07/11/17 07:35 











- Neuro Exam


Level Of Consciousness: Yes: Alert (awake and conversive though confused, not 

orienetd to date, knows address, R periorbital contusion, EOMI, no facial, 

motor : no focal weakness, no asterixis, planatars down )





NIH Stroke Scale





- Total Score


NIH Stroke Scale Score: 0





Imaging





- Results


Cat Scan: Report Reviewed, Image Reviewed





Problem List





- Problems


(1) ETOH abuse


Code(s): F10.10 - ALCOHOL ABUSE, UNCOMPLICATED





(2) Head trauma


Code(s): S09.90XA - UNSPECIFIED INJURY OF HEAD, INITIAL ENCOUNTER   








Assessment/Plan


52-year-old male with history of diabetes and alcohol abuse Presents with 

mechanical fall. The patient reported that he had several drinks of beer and he 

fell. 


? if he feel out of bed; denies HA or focal c/o.  Patient denies headache, 

nausea or vomiting.  Patient does report that lately he had some few loose 

stools and nausea and vomiting.drinks 6-76 drinks/day., last drink yesterday; 

lives with sister.


+ temp noted . pt not oriented to date--poor HX. 


CT HD no acute pathology, mild MVD, and atrophy , no bleed. 





ACTIVE ETOHer with fall, no signs stroke, meningitis, subdural; 


agree LIBRIUM, though no clear signs of withdrawal


thiamine, folate, B12


fever VITALE


S/W consider rehab 





Dr Virk 


[phone]

## 2017-07-12 NOTE — PN
Progress Note, Physician


History of Present Illness: 


patient continues to spike fever


also resp issues


incontinent





- Current Medication List


Current Medications: 


Active Medications





Albuterol Sulfate (Ventolin 0.083% Nebulizer Soln -)  1 amp NEB Q4H PRN


   PRN Reason: SHORT OF BREATH/WHEEZING


   Last Admin: 07/12/17 08:51 Dose:  1 amp


Albuterol/Ipratropium (Duoneb -)  1 amp NEB QIDR [facility]


Cefepime HCl (Maxipime 1gm Ivpb Pre-Docked)  1 gm IVPB Q8H [facility]


   PRN Reason: Protocol


   Last Admin: 07/12/17 09:20 Dose:  1 gm


Chlordiazepoxide HCl (Librium -)  25 mg PO Q4H PRN


   PRN Reason: WITHDRAWAL(CONT SUBST)


   Stop: 07/13/17 15:22


Chlordiazepoxide HCl (Librium -)  15 mg PO [id number] [facility]


   Stop: 07/13/17 11:01


Folic Acid (Folic Acid -)  1 mg PO DAILY [facility]


   Last Admin: 07/12/17 09:26 Dose:  1 mg


Sodium Chloride (Normal Saline -)  1,000 mls @ 100 mls/hr IV ASDIR [facility]


Clindamycin Phosphate 300 mg/ (Dextrose)  50 mls @ 104 mls/hr IVPB Q8H-IV [facility]


Ibuprofen (Motrin -)  400 mg PO Q6H PRN


   PRN Reason: PAIN


   Last Admin: 07/12/17 11:36 Dose:  400 mg


Multivitamins/Minerals/Vitamin C (Tab-A-Vit -)  1 tab PO DAILY [facility]


   Last Admin: 07/12/17 09:26 Dose:  1 tab


Potassium Chloride (K-Dur -)  40 meq PO DAILY [facility]


   Last Admin: 07/12/17 09:26 Dose:  40 meq


Thiamine HCl (Vitamin B1 -)  100 mg PO BID [facility]


   Last Admin: 07/12/17 09:26 Dose:  100 mg











- Objective


Vital Signs: 


 Vital Signs











Temperature  101 F H  07/12/17 13:07


 


Pulse Rate  87   07/12/17 14:07


 


Respiratory Rate  36 H  07/12/17 14:07


 


Blood Pressure  92/53   07/12/17 14:07


 


O2 Sat by Pulse Oximetry (%)  93 L  07/12/17 08:35











Constitutional: Yes: Moderate Distress


Eyes: Yes: Conjunctiva Clear


Cardiovascular: Yes: Regular Rate and Rhythm


Respiratory: Yes: Regular, Poor Air Entry, Rhonchi


Gastrointestinal: Yes: Normal Bowel Sounds, Soft


Musculoskeletal: Yes: WNL


Extremities: Yes: WNL


Neurological: Yes: Alert, Other


Labs: 


 CBC, BMP





 07/12/17 09:45 











Assessment/Plan


 Problem List 





- Problems


(1) ETOH abuse


Code(s): F10.10 - ALCOHOL ABUSE, UNCOMPLICATED





(2) Hypokalemia


Code(s): E87.6 - HYPOKALEMIA





(3) Hyponatremia


Code(s): E87.1 - HYPO-OSMOLALITY AND HYPONATREMIA





(4) Alcohol dependence with uncomplicated withdrawal


Code(s): F10.230 - ALCOHOL DEPENDENCE WITH WITHDRAWAL, UNCOMPLICATED





5 fever





6 asp pna





looking at the xray i think patient has aspirated





plan


continue cefipime


will add clinda


hydration


monitor fevers


await for cx report


lactic acid


pul following

## 2017-07-13 LAB
AMYLASE SERPL-CCNC: 68 U/L (ref 25–115)
DEPRECATED RDW RBC AUTO: 16.4 % (ref 11.9–15.9)
INR BLD: 1.49 (ref 0.82–1.09)
MCH RBC QN AUTO: 30.8 PG (ref 25.7–33.7)
MCHC RBC AUTO-ENTMCNC: 34.2 G/DL (ref 32–35.9)
MCV RBC: 90.2 FL (ref 80–96)
PLATELET # BLD AUTO: 90 K/MM3 (ref 134–434)
PMV BLD: 10.1 FL (ref 7.5–11.1)
PT PNL PPP: 16.5 SEC (ref 9.98–11.88)
WBC # BLD AUTO: 9.3 K/MM3 (ref 4–10)

## 2017-07-13 RX ADMIN — CEFEPIME HYDROCHLORIDE SCH GM: 1 INJECTION, POWDER, FOR SOLUTION INTRAMUSCULAR; INTRAVENOUS at 16:30

## 2017-07-13 RX ADMIN — IPRATROPIUM BROMIDE AND ALBUTEROL SULFATE SCH AMP: .5; 3 SOLUTION RESPIRATORY (INHALATION) at 11:45

## 2017-07-13 RX ADMIN — IPRATROPIUM BROMIDE AND ALBUTEROL SULFATE SCH AMP: .5; 3 SOLUTION RESPIRATORY (INHALATION) at 18:22

## 2017-07-13 RX ADMIN — AZITHROMYCIN DIHYDRATE SCH MG: 500 INJECTION, POWDER, LYOPHILIZED, FOR SOLUTION INTRAVENOUS at 12:01

## 2017-07-13 RX ADMIN — Medication SCH MG: at 22:32

## 2017-07-13 RX ADMIN — IPRATROPIUM BROMIDE AND ALBUTEROL SULFATE SCH AMP: .5; 3 SOLUTION RESPIRATORY (INHALATION) at 23:27

## 2017-07-13 RX ADMIN — CLINDAMYCIN PHOSPHATE SCH MLS/HR: 300 INJECTION, SOLUTION INTRAVENOUS at 18:20

## 2017-07-13 RX ADMIN — CEFEPIME HYDROCHLORIDE SCH GM: 1 INJECTION, POWDER, FOR SOLUTION INTRAMUSCULAR; INTRAVENOUS at 23:27

## 2017-07-13 RX ADMIN — CEFEPIME HYDROCHLORIDE SCH GM: 1 INJECTION, POWDER, FOR SOLUTION INTRAMUSCULAR; INTRAVENOUS at 08:29

## 2017-07-13 RX ADMIN — FOLIC ACID SCH MG: 1 TABLET ORAL at 10:39

## 2017-07-13 RX ADMIN — Medication SCH MG: at 10:40

## 2017-07-13 RX ADMIN — MULTIVITAMIN TABLET SCH TAB: TABLET at 10:39

## 2017-07-13 RX ADMIN — CLINDAMYCIN PHOSPHATE SCH MLS/HR: 300 INJECTION, SOLUTION INTRAVENOUS at 01:21

## 2017-07-13 RX ADMIN — POTASSIUM CHLORIDE SCH MEQ: 1500 TABLET, EXTENDED RELEASE ORAL at 10:39

## 2017-07-13 RX ADMIN — CLINDAMYCIN PHOSPHATE SCH MLS/HR: 300 INJECTION, SOLUTION INTRAVENOUS at 10:40

## 2017-07-13 RX ADMIN — IPRATROPIUM BROMIDE AND ALBUTEROL SULFATE SCH AMP: .5; 3 SOLUTION RESPIRATORY (INHALATION) at 06:17

## 2017-07-13 RX ADMIN — IBUPROFEN PRN MG: 400 TABLET, FILM COATED ORAL at 05:48

## 2017-07-13 RX ADMIN — SODIUM CHLORIDE SCH MLS/HR: 9 INJECTION, SOLUTION INTRAVENOUS at 16:29

## 2017-07-13 NOTE — PN
Progress Note, Physician


History of Present Illness: 


AWAKE





- Current Medication List


Current Medications: 


Active Medications





Albuterol Sulfate (Ventolin 0.083% Nebulizer Soln -)  1 amp NEB Q4H PRN


   PRN Reason: SHORT OF BREATH/WHEEZING


   Last Admin: 07/12/17 08:51 Dose:  1 amp


Albuterol/Ipratropium (Duoneb -)  1 amp NEB QIDR [facility]


   Last Admin: 07/13/17 11:45 Dose:  1 amp


Azithromycin (Zithromax 500mg Ivpb (Pre-Docked))  500 mg IVPB DAILY [facility]


   Last Admin: 07/13/17 12:01 Dose:  500 mg


Cefepime HCl (Maxipime 1gm Ivpb Pre-Docked)  1 gm IVPB Q8H [facility]


   PRN Reason: Protocol


   Last Admin: 07/13/17 16:30 Dose:  1 gm


Folic Acid (Folic Acid -)  1 mg PO DAILY [facility]


   Last Admin: 07/13/17 10:39 Dose:  1 mg


Sodium Chloride (Normal Saline -)  1,000 mls @ 100 mls/hr IV ASDIR [facility]


   Last Admin: 07/13/17 16:29 Dose:  100 mls/hr


Clindamycin Phosphate (Cleocin 300 Mg Premix Ivpb)  50 mls @ 100 mls/hr IVPB Q8H

-IV [facility]


   Last Admin: 07/13/17 10:40 Dose:  100 mls/hr


Ibuprofen (Motrin -)  400 mg PO Q6H PRN


   PRN Reason: PAIN


   Last Admin: 07/13/17 05:48 Dose:  400 mg


Multivitamins/Minerals/Vitamin C (Tab-A-Vit -)  1 tab PO DAILY [facility]


   Last Admin: 07/13/17 10:39 Dose:  1 tab


Potassium Chloride (K-Dur -)  40 meq PO DAILY [facility]


   Last Admin: 07/13/17 10:39 Dose:  40 meq


Thiamine HCl (Vitamin B1 -)  100 mg PO BID [facility]


   Last Admin: 07/13/17 10:40 Dose:  100 mg











- Objective


Vital Signs: 


 Vital Signs











Temperature  98.2 F   07/13/17 14:51


 


Pulse Rate  93 H  07/13/17 14:51


 


Respiratory Rate  22   07/13/17 14:51


 


Blood Pressure  93/57   07/13/17 14:51


 


O2 Sat by Pulse Oximetry (%)  93 L  07/13/17 11:00











HENT: Yes: Atraumatic


Neck: Yes: Supple


Cardiovascular: Yes: Regular Rate and Rhythm


Respiratory: Yes: Rales, Rhonchi


Gastrointestinal: Yes: Normal Bowel Sounds


Extremities: Yes: WNL


Neurological: Yes: Alert


Labs: 


 CBC, BMP





 07/13/17 08:00 





 07/12/17 09:45 





 INR, PTT











INR  1.49  (0.82-1.09)  H  07/13/17  08:05    














Problem List





- Problems


(1) ETOH abuse


Assessment/Plan: 


on librium protocol...ON HOLD DUE TO DROWSINESS


Code(s): F10.10 - ALCOHOL ABUSE, UNCOMPLICATED





(2) Hypokalemia


Assessment/Plan: 


RESOLVED





Code(s): E87.6 - HYPOKALEMIA





(3) Hyponatremia


Assessment/Plan: 


RESOLVING


Code(s): E87.1 - HYPO-OSMOLALITY AND HYPONATREMIA





(4) Alcohol dependence with uncomplicated withdrawal


Assessment/Plan: 


prn ativan


detox consult


Code(s): F10.230 - ALCOHOL DEPENDENCE WITH WITHDRAWAL, UNCOMPLICATED





(5) Alcoholic liver disease


Code(s): K70.9 - ALCOHOLIC LIVER DISEASE, UNSPECIFIED





(6) DM Diabetes mellitus type 2


Code(s): E11.9 - TYPE 2 DIABETES MELLITUS WITHOUT COMPLICATIONS





(7) HTN (hypertension)


Assessment/Plan: 


hypotension...on iv fluids


Code(s): I10 - ESSENTIAL (PRIMARY) HYPERTENSION   





(8) Left upper lobe pneumonia


Assessment/Plan: 


on iv abx p[er id


cxr done


has consolidation on right lung base 


Code(s): J18.1 - LOBAR PNEUMONIA, UNSPECIFIED ORGANISM

## 2017-07-13 NOTE — PN
Progress Note, Physician


History of Present Illness: 


fevers improving


patient still continues to be drowsy


eating








- Current Medication List


Current Medications: 


Active Medications





Albuterol Sulfate (Ventolin 0.083% Nebulizer Soln -)  1 amp NEB Q4H PRN


   PRN Reason: SHORT OF BREATH/WHEEZING


   Last Admin: 07/12/17 08:51 Dose:  1 amp


Albuterol/Ipratropium (Duoneb -)  1 amp NEB QIDR [facility]


   Last Admin: 07/13/17 11:45 Dose:  1 amp


Azithromycin (Zithromax 500mg Ivpb (Pre-Docked))  500 mg IVPB DAILY [facility]


   Last Admin: 07/13/17 12:01 Dose:  500 mg


Cefepime HCl (Maxipime 1gm Ivpb Pre-Docked)  1 gm IVPB Q8H [facility]


   PRN Reason: Protocol


   Last Admin: 07/13/17 16:30 Dose:  1 gm


Folic Acid (Folic Acid -)  1 mg PO DAILY [facility]


   Last Admin: 07/13/17 10:39 Dose:  1 mg


Sodium Chloride (Normal Saline -)  1,000 mls @ 100 mls/hr IV ASDIR [facility]


   Last Admin: 07/13/17 16:29 Dose:  100 mls/hr


Clindamycin Phosphate (Cleocin 300 Mg Premix Ivpb)  50 mls @ 100 mls/hr IVPB Q8H

-IV [facility]


   Last Admin: 07/13/17 10:40 Dose:  100 mls/hr


Ibuprofen (Motrin -)  400 mg PO Q6H PRN


   PRN Reason: PAIN


   Last Admin: 07/13/17 05:48 Dose:  400 mg


Multivitamins/Minerals/Vitamin C (Tab-A-Vit -)  1 tab PO DAILY [facility]


   Last Admin: 07/13/17 10:39 Dose:  1 tab


Potassium Chloride (K-Dur -)  40 meq PO DAILY [facility]


   Last Admin: 07/13/17 10:39 Dose:  40 meq


Thiamine HCl (Vitamin B1 -)  100 mg PO BID [facility]


   Last Admin: 07/13/17 10:40 Dose:  100 mg











- Objective


Vital Signs: 


 Vital Signs











Temperature  98.2 F   07/13/17 14:51


 


Pulse Rate  93 H  07/13/17 14:51


 


Respiratory Rate  22   07/13/17 14:51


 


Blood Pressure  93/57   07/13/17 14:51


 


O2 Sat by Pulse Oximetry (%)  93 L  07/13/17 11:00











Constitutional: Yes: Calm, Mild Distress


Cardiovascular: Yes: Regular Rate and Rhythm


Respiratory: Yes: Regular, On Nasal O2, Rhonchi, Other


Gastrointestinal: Yes: Normal Bowel Sounds, Soft


Musculoskeletal: Yes: WNL


Extremities: Yes: WNL


Integumentary: Yes: WNL


Neurological: Yes: Alert, Oriented


Psychiatric: Yes: Alert


Labs: 


 CBC, BMP





 07/13/17 08:00 





 07/12/17 09:45 





 INR, PTT











INR  1.49  (0.82-1.09)  H  07/13/17  08:05    














Assessment/Plan


 Problem List 





- Problems


(1) ETOH abuse


Code(s): F10.10 - ALCOHOL ABUSE, UNCOMPLICATED





(2) Hypokalemia


Code(s): E87.6 - HYPOKALEMIA





(3) Hyponatremia


Code(s): E87.1 - HYPO-OSMOLALITY AND HYPONATREMIA





(4) Alcohol dependence with uncomplicated withdrawal


Code(s): F10.230 - ALCOHOL DEPENDENCE WITH WITHDRAWAL, UNCOMPLICATED





5 fever





6 asp pna





looking at the xray i think patient has aspirated





plan


continue cefipime


ct c clinda


hydration


monitor fevers

## 2017-07-13 NOTE — PN
Progress Note (short form)





- Note


Progress Note: 


PULMONARY





CT chest with large left sided consolidation. Febrile to 101.1 this AM. Still 

short of breath with cough productive of green sputum. 





 Last Vital Signs











Temp Pulse Resp BP Pulse Ox


 


 98.9 F   94 H  22   118/69   96 


 


 07/13/17 06:43  07/13/17 06:00  07/13/17 06:00  07/13/17 06:00  07/12/17 22:00








Gen:  tachypneic at rest


Heart:  RRR


Lung: left sided rhonchi, rales


Abd: soft, nontender


Ext: no edema





 CBC, BMP





 07/13/17 08:00 





 07/12/17 09:45 





 Hepatic Panel











Total Bilirubin  1.8 mg/dL (0.2-1.0)  H  07/12/17  09:45    


 


AST  98 U/L (15-37)  H  07/12/17  09:45    


 


ALT  39 U/L (12-78)   07/12/17  09:45    


 


Alkaline Phosphatase  151 U/L ()  H  07/12/17  09:45    


 


Albumin  1.8 g/dl (3.4-5.0)  L  07/12/17  09:45    











Active Medications





Albuterol Sulfate (Ventolin 0.083% Nebulizer Soln -)  1 amp NEB Q4H PRN


   PRN Reason: SHORT OF BREATH/WHEEZING


   Last Admin: 07/12/17 08:51 Dose:  1 amp


Albuterol/Ipratropium (Duoneb -)  1 amp NEB QIDR [facility]


   Last Admin: 07/13/17 06:17 Dose:  1 amp


Azithromycin (Zithromax 500mg Ivpb (Pre-Docked))  500 mg IVPB DAILY [facility]


   Last Admin: 07/13/17 12:01 Dose:  500 mg


Cefepime HCl (Maxipime 1gm Ivpb Pre-Docked)  1 gm IVPB Q8H [facility]


   PRN Reason: Protocol


   Last Admin: 07/13/17 08:29 Dose:  1 gm


Chlordiazepoxide HCl (Librium -)  25 mg PO Q4H PRN


   PRN Reason: WITHDRAWAL(CONT SUBST)


   Stop: 07/13/17 15:22


Folic Acid (Folic Acid -)  1 mg PO DAILY [facility]


   Last Admin: 07/13/17 10:39 Dose:  1 mg


Sodium Chloride (Normal Saline -)  1,000 mls @ 100 mls/hr IV ASDIR [facility]


   Last Admin: 07/12/17 23:33 Dose:  100 mls/hr


Clindamycin Phosphate (Cleocin 300 Mg Premix Ivpb)  50 mls @ 100 mls/hr IVPB Q8H

-IV [facility]


   Last Admin: 07/13/17 10:40 Dose:  100 mls/hr


Ibuprofen (Motrin -)  400 mg PO Q6H PRN


   PRN Reason: PAIN


   Last Admin: 07/13/17 05:48 Dose:  400 mg


Multivitamins/Minerals/Vitamin C (Tab-A-Vit -)  1 tab PO DAILY [facility]


   Last Admin: 07/13/17 10:39 Dose:  1 tab


Potassium Chloride (K-Dur -)  40 meq PO DAILY [facility]


   Last Admin: 07/13/17 10:39 Dose:  40 meq


Thiamine HCl (Vitamin B1 -)  100 mg PO BID [facility]


   Last Admin: 07/13/17 10:40 Dose:  100 mg





A/P


Acute Hypoxic Respiratory Failure


Pneumonia


Sepsis


Elevated LFTs


Hyponatremia


Thromboctyopenia





-  continue antibiotics


-  f/u cultures


-  O2 to keep Spo2 >90%


-  inhaled bronchodilators


-  monitor lytes, platelets


-  aspiration precautions


-  DVT prophylaxis


-  will need outpt f/u of chest imaging to ensure resolution of infiltrate

## 2017-07-14 PROCEDURE — 3E0F7GC INTRODUCTION OF OTHER THERAPEUTIC SUBSTANCE INTO RESPIRATORY TRACT, VIA NATURAL OR ARTIFICIAL OPENING: ICD-10-PCS | Performed by: INTERNAL MEDICINE

## 2017-07-14 RX ADMIN — IPRATROPIUM BROMIDE AND ALBUTEROL SULFATE SCH AMP: .5; 3 SOLUTION RESPIRATORY (INHALATION) at 18:30

## 2017-07-14 RX ADMIN — IPRATROPIUM BROMIDE AND ALBUTEROL SULFATE SCH AMP: .5; 3 SOLUTION RESPIRATORY (INHALATION) at 12:05

## 2017-07-14 RX ADMIN — CLINDAMYCIN PHOSPHATE SCH MLS/HR: 300 INJECTION, SOLUTION INTRAVENOUS at 02:05

## 2017-07-14 RX ADMIN — METHYLPREDNISOLONE SODIUM SUCCINATE SCH MG: 40 INJECTION, POWDER, FOR SOLUTION INTRAMUSCULAR; INTRAVENOUS at 14:24

## 2017-07-14 RX ADMIN — POTASSIUM CHLORIDE SCH MEQ: 1500 TABLET, EXTENDED RELEASE ORAL at 11:48

## 2017-07-14 RX ADMIN — Medication SCH MG: at 11:48

## 2017-07-14 RX ADMIN — CEFEPIME HYDROCHLORIDE SCH GM: 1 INJECTION, POWDER, FOR SOLUTION INTRAMUSCULAR; INTRAVENOUS at 17:24

## 2017-07-14 RX ADMIN — AZITHROMYCIN DIHYDRATE SCH MG: 500 INJECTION, POWDER, LYOPHILIZED, FOR SOLUTION INTRAVENOUS at 11:48

## 2017-07-14 RX ADMIN — CEFEPIME HYDROCHLORIDE SCH GM: 1 INJECTION, POWDER, FOR SOLUTION INTRAMUSCULAR; INTRAVENOUS at 11:48

## 2017-07-14 RX ADMIN — METHYLPREDNISOLONE SODIUM SUCCINATE SCH MG: 40 INJECTION, POWDER, FOR SOLUTION INTRAMUSCULAR; INTRAVENOUS at 17:23

## 2017-07-14 RX ADMIN — Medication SCH MG: at 23:15

## 2017-07-14 RX ADMIN — FOLIC ACID SCH MG: 1 TABLET ORAL at 11:48

## 2017-07-14 RX ADMIN — CLINDAMYCIN PHOSPHATE SCH MLS/HR: 300 INJECTION, SOLUTION INTRAVENOUS at 11:44

## 2017-07-14 RX ADMIN — MULTIVITAMIN TABLET SCH TAB: TABLET at 11:48

## 2017-07-14 RX ADMIN — IPRATROPIUM BROMIDE AND ALBUTEROL SULFATE SCH AMP: .5; 3 SOLUTION RESPIRATORY (INHALATION) at 06:25

## 2017-07-14 RX ADMIN — IPRATROPIUM BROMIDE AND ALBUTEROL SULFATE SCH AMP: .5; 3 SOLUTION RESPIRATORY (INHALATION) at 23:03

## 2017-07-14 NOTE — PN
Progress Note, Physician


History of Present Illness: 


AWAKE alert





- Current Medication List


Current Medications: 


Active Medications





Albuterol Sulfate (Ventolin 0.083% Nebulizer Soln -)  1 amp NEB Q4H PRN


   PRN Reason: SHORT OF BREATH/WHEEZING


   Last Admin: 07/12/17 08:51 Dose:  1 amp


Albuterol/Ipratropium (Duoneb -)  1 amp NEB QIDR [facility]


   Last Admin: 07/14/17 12:05 Dose:  1 amp


Azithromycin (Zithromax 500mg Ivpb (Pre-Docked))  500 mg IVPB DAILY [facility]


   Last Admin: 07/14/17 11:48 Dose:  500 mg


Cefepime HCl (Maxipime 1gm Ivpb Pre-Docked)  1 gm IVPB Q8H [facility]


   PRN Reason: Protocol


   Last Admin: 07/14/17 17:24 Dose:  1 gm


Folic Acid (Folic Acid -)  1 mg PO DAILY [facility]


   Last Admin: 07/14/17 11:48 Dose:  1 mg


Ibuprofen (Motrin -)  400 mg PO Q6H PRN


   PRN Reason: PAIN


   Last Admin: 07/13/17 05:48 Dose:  400 mg


Methylprednisolone Sodium Succinate (Solu-Medrol -)  40 mg IVPB Q8H-IV [facility]


   Last Admin: 07/14/17 17:23 Dose:  40 mg


Multivitamins/Minerals/Vitamin C (Tab-A-Vit -)  1 tab PO DAILY [facility]


   Last Admin: 07/14/17 11:48 Dose:  1 tab


Potassium Chloride (K-Dur -)  40 meq PO DAILY [facility]


   Last Admin: 07/14/17 11:48 Dose:  40 meq


Thiamine HCl (Vitamin B1 -)  100 mg PO BID [facility]


   Last Admin: 07/14/17 11:48 Dose:  100 mg











- Objective


Vital Signs: 


 Vital Signs











Temperature  98.9 F   07/14/17 14:08


 


Pulse Rate  96 H  07/14/17 14:08


 


Respiratory Rate  22   07/14/17 14:08


 


Blood Pressure  119/74   07/14/17 14:08


 


O2 Sat by Pulse Oximetry (%)  92 L  07/14/17 10:00











Constitutional: Yes: No Distress


HENT: Yes: Atraumatic


Neck: Yes: Supple


Cardiovascular: Yes: Regular Rate and Rhythm


Respiratory: Yes: CTA Bilaterally


Gastrointestinal: Yes: Normal Bowel Sounds


Extremities: Yes: WNL


Neurological: Yes: Alert, Oriented


Labs: 


 CBC, BMP





 07/13/17 08:00 





 07/12/17 09:45 





 INR, PTT











INR  1.49  (0.82-1.09)  H  07/13/17  08:05    














Problem List





- Problems


(1) ETOH abuse


Assessment/Plan: 


 librium protocol...ON HOLD DUE TO DROWSINESS


Code(s): F10.10 - ALCOHOL ABUSE, UNCOMPLICATED





(2) Hypokalemia


Assessment/Plan: 


RESOLVED





Code(s): E87.6 - HYPOKALEMIA





(3) Hyponatremia


Assessment/Plan: 


RESOLVING


Code(s): E87.1 - HYPO-OSMOLALITY AND HYPONATREMIA





(4) Alcohol dependence with uncomplicated withdrawal


Assessment/Plan: 


prn ativan


detox consult


Code(s): F10.230 - ALCOHOL DEPENDENCE WITH WITHDRAWAL, UNCOMPLICATED





(5) Alcoholic liver disease


Code(s): K70.9 - ALCOHOLIC LIVER DISEASE, UNSPECIFIED





(6) DM Diabetes mellitus type 2


Code(s): E11.9 - TYPE 2 DIABETES MELLITUS WITHOUT COMPLICATIONS





(7) HTN (hypertension)


Assessment/Plan: 


bp stable now


Code(s): I10 - ESSENTIAL (PRIMARY) HYPERTENSION   





(8) Left upper lobe pneumonia


Assessment/Plan: 


on iv abx p[er id


cxr done


has consolidation on right lung base 


Code(s): J18.1 - LOBAR PNEUMONIA, UNSPECIFIED ORGANISM

## 2017-07-14 NOTE — PN
Teaching Attending Note


Name of Resident: Miguel Alcantara


ATTENDING PHYSICIAN STATEMENT





I saw and evaluated the patient.


I reviewed the resident's note and discussed the case with the resident.


I agree with the resident's findings and plan as documented.





PULMONARY





DROWSY,REMAINS HYPOXIC ON RA,+ SOB,+ COUGH.CHEST CT EXTENSIVE KENNETH CONSOLIDATION,

LEFT LL INFILTRATE,RLL INFILTRATE








IMP ACUTE HYPOXEMIC RESPIRATORY FAILURE


      SEPSIS


      PNEUMONIA  CAP,?ASPIRATION


      HYPONATREMIA  


      ETOH


      ELEVATED LFTS


      S/P FALL


      THROMBOCYTOPENIA





PLAN ANTIBIOTICS


        IVF


        INHALED BRONCHODILATORS


         STEROIDS 


         MONITORS LYTES/NA,LFTS


         


         


        


         





DR REYEZ


      


      





 Problem List 





- Problems


(1) ETOH abuse


Code(s): F10.10 - ALCOHOL ABUSE, UNCOMPLICATED





(2) Head trauma


Code(s): S09.90XA - UNSPECIFIED INJURY OF HEAD, INITIAL ENCOUNTER   





(3) Hypokalemia


Code(s): E87.6 - HYPOKALEMIA





(4) Hyponatremia


Code(s): E87.1 - HYPO-OSMOLALITY AND HYPONATREMIA





(5) Left upper lobe pneumonia


Code(s): J18.1 - LOBAR PNEUMONIA, UNSPECIFIED ORGANISM   





(6) Sepsis associated hypotension


Code(s): A41.9 - SEPSIS, UNSPECIFIED ORGANISM





(7) Alcohol dependence with uncomplicated withdrawal


Code(s): F10.230 - ALCOHOL DEPENDENCE WITH WITHDRAWAL, UNCOMPLICATED





(8) Hand abrasion


Code(s): S60.519A - ABRASION OF UNSPECIFIED HAND, INITIAL ENCOUNTER   Qualifiers

: 


     Laterality: right





(9) Alcoholic liver disease


Code(s): K70.9 - ALCOHOLIC LIVER DISEASE, UNSPECIFIED





(10) DM Diabetes mellitus type 2


Code(s): E11.9 - TYPE 2 DIABETES MELLITUS WITHOUT COMPLICATIONS





(11) Acute hypoxemic respiratory failure


Code(s): J96.01 - ACUTE RESPIRATORY FAILURE WITH HYPOXIA








Problem List





- Problems


(1) ETOH abuse


Code(s): F10.10 - ALCOHOL ABUSE, UNCOMPLICATED





(2) Head trauma


Code(s): S09.90XA - UNSPECIFIED INJURY OF HEAD, INITIAL ENCOUNTER   





(3) Hypokalemia


Code(s): E87.6 - HYPOKALEMIA





(4) Hyponatremia


Code(s): E87.1 - HYPO-OSMOLALITY AND HYPONATREMIA





(5) Left upper lobe pneumonia


Code(s): J18.1 - LOBAR PNEUMONIA, UNSPECIFIED ORGANISM   





(6) Sepsis associated hypotension


Code(s): A41.9 - SEPSIS, UNSPECIFIED ORGANISM





(7) Alcohol dependence with uncomplicated withdrawal


Code(s): F10.230 - ALCOHOL DEPENDENCE WITH WITHDRAWAL, UNCOMPLICATED





(8) Hand abrasion


Code(s): S60.519A - ABRASION OF UNSPECIFIED HAND, INITIAL ENCOUNTER   Qualifiers

: 


     Laterality: right





(9) Alcoholic liver disease


Code(s): K70.9 - ALCOHOLIC LIVER DISEASE, UNSPECIFIED





(10) DM Diabetes mellitus type 2


Code(s): E11.9 - TYPE 2 DIABETES MELLITUS WITHOUT COMPLICATIONS





(11) Acute hypoxemic respiratory failure


Code(s): J96.01 - ACUTE RESPIRATORY FAILURE WITH HYPOXIA

## 2017-07-14 NOTE — PN
Progress Note, Physician


History of Present Illness: 


improving


has been afebrile





- Current Medication List


Current Medications: 


Active Medications





Albuterol Sulfate (Ventolin 0.083% Nebulizer Soln -)  1 amp NEB Q4H PRN


   PRN Reason: SHORT OF BREATH/WHEEZING


   Last Admin: 07/12/17 08:51 Dose:  1 amp


Albuterol/Ipratropium (Duoneb -)  1 amp NEB QIDR [facility]


   Last Admin: 07/14/17 12:05 Dose:  1 amp


Azithromycin (Zithromax 500mg Ivpb (Pre-Docked))  500 mg IVPB DAILY [facility]


   Last Admin: 07/14/17 11:48 Dose:  500 mg


Cefepime HCl (Maxipime 1gm Ivpb Pre-Docked)  1 gm IVPB Q8H KELI


   PRN Reason: Protocol


   Last Admin: 07/14/17 11:48 Dose:  1 gm


Folic Acid (Folic Acid -)  1 mg PO DAILY [facility]


   Last Admin: 07/14/17 11:48 Dose:  1 mg


Ibuprofen (Motrin -)  400 mg PO Q6H PRN


   PRN Reason: PAIN


   Last Admin: 07/13/17 05:48 Dose:  400 mg


Methylprednisolone Sodium Succinate (Solu-Medrol -)  40 mg IVPB Q8H-IV KELI


   Last Admin: 07/14/17 14:24 Dose:  40 mg


Multivitamins/Minerals/Vitamin C (Tab-A-Vit -)  1 tab PO DAILY [facility]


   Last Admin: 07/14/17 11:48 Dose:  1 tab


Potassium Chloride (K-Dur -)  40 meq PO DAILY [facility]


   Last Admin: 07/14/17 11:48 Dose:  40 meq


Thiamine HCl (Vitamin B1 -)  100 mg PO BID [facility]


   Last Admin: 07/14/17 11:48 Dose:  100 mg











- Objective


Vital Signs: 


 Vital Signs











Temperature  98.9 F   07/14/17 14:08


 


Pulse Rate  96 H  07/14/17 14:08


 


Respiratory Rate  22   07/14/17 14:08


 


Blood Pressure  119/74   07/14/17 14:08


 


O2 Sat by Pulse Oximetry (%)  92 L  07/14/17 10:00











Constitutional: Yes: No Distress, Calm


Cardiovascular: Yes: Regular Rate and Rhythm


Respiratory: Yes: Regular, On Nasal O2, Rhonchi, Other


Gastrointestinal: Yes: Normal Bowel Sounds, Soft


Musculoskeletal: Yes: WNL


Extremities: Yes: WNL


Neurological: Yes: Alert


Psychiatric: Yes: Alert


Labs: 


 CBC, BMP





 07/13/17 08:00 





 07/12/17 09:45 





 INR, PTT











INR  1.49  (0.82-1.09)  H  07/13/17  08:05    














Assessment/Plan


 Problem List 





- Problems


(1) ETOH abuse


Code(s): F10.10 - ALCOHOL ABUSE, UNCOMPLICATED





(2) Hypokalemia


Code(s): E87.6 - HYPOKALEMIA





(3) Hyponatremia


Code(s): E87.1 - HYPO-OSMOLALITY AND HYPONATREMIA





(4) Alcohol dependence with uncomplicated withdrawal


Code(s): F10.230 - ALCOHOL DEPENDENCE WITH WITHDRAWAL, UNCOMPLICATED





5 fever





6 asp pna





looking at the xray i think patient has aspirated





plan


continue abx


will stop clinda


patient doing well


rest as per primary

## 2017-07-15 LAB
ANION GAP SERPL CALC-SCNC: 10 MMOL/L (ref 8–16)
BASOPHILS # BLD: 0.3 % (ref 0–2)
CALCIUM SERPL-MCNC: 8 MG/DL (ref 8.5–10.1)
CO2 SERPL-SCNC: 24 MMOL/L (ref 21–32)
CREAT SERPL-MCNC: 0.7 MG/DL (ref 0.7–1.3)
DEPRECATED RDW RBC AUTO: 16.7 % (ref 11.9–15.9)
EOSINOPHIL # BLD: 0 % (ref 0–4.5)
GLUCOSE SERPL-MCNC: 402 MG/DL (ref 74–106)
MCH RBC QN AUTO: 30.6 PG (ref 25.7–33.7)
MCHC RBC AUTO-ENTMCNC: 33.5 G/DL (ref 32–35.9)
MCV RBC: 91.3 FL (ref 80–96)
NEUTROPHILS # BLD: 84.9 % (ref 42.8–82.8)
PLATELET # BLD AUTO: 127 K/MM3 (ref 134–434)
PMV BLD: 9.6 FL (ref 7.5–11.1)
WBC # BLD AUTO: 4.9 K/MM3 (ref 4–10)

## 2017-07-15 RX ADMIN — INSULIN ASPART SCH UNITS: 100 INJECTION, SOLUTION INTRAVENOUS; SUBCUTANEOUS at 16:11

## 2017-07-15 RX ADMIN — CEFEPIME HYDROCHLORIDE SCH GM: 1 INJECTION, POWDER, FOR SOLUTION INTRAMUSCULAR; INTRAVENOUS at 00:36

## 2017-07-15 RX ADMIN — Medication SCH MG: at 09:44

## 2017-07-15 RX ADMIN — CEFEPIME HYDROCHLORIDE SCH GM: 1 INJECTION, POWDER, FOR SOLUTION INTRAMUSCULAR; INTRAVENOUS at 08:54

## 2017-07-15 RX ADMIN — CEFEPIME HYDROCHLORIDE SCH GM: 1 INJECTION, POWDER, FOR SOLUTION INTRAMUSCULAR; INTRAVENOUS at 15:36

## 2017-07-15 RX ADMIN — IPRATROPIUM BROMIDE AND ALBUTEROL SULFATE SCH AMP: .5; 3 SOLUTION RESPIRATORY (INHALATION) at 11:12

## 2017-07-15 RX ADMIN — POTASSIUM CHLORIDE SCH MEQ: 1500 TABLET, EXTENDED RELEASE ORAL at 09:45

## 2017-07-15 RX ADMIN — FOLIC ACID SCH MG: 1 TABLET ORAL at 09:43

## 2017-07-15 RX ADMIN — AZITHROMYCIN DIHYDRATE SCH MG: 500 INJECTION, POWDER, LYOPHILIZED, FOR SOLUTION INTRAVENOUS at 11:13

## 2017-07-15 RX ADMIN — Medication SCH MG: at 22:09

## 2017-07-15 RX ADMIN — IPRATROPIUM BROMIDE AND ALBUTEROL SULFATE SCH AMP: .5; 3 SOLUTION RESPIRATORY (INHALATION) at 23:08

## 2017-07-15 RX ADMIN — IPRATROPIUM BROMIDE AND ALBUTEROL SULFATE SCH AMP: .5; 3 SOLUTION RESPIRATORY (INHALATION) at 06:50

## 2017-07-15 RX ADMIN — MULTIVITAMIN TABLET SCH TAB: TABLET at 09:45

## 2017-07-15 RX ADMIN — METHYLPREDNISOLONE SODIUM SUCCINATE SCH MG: 40 INJECTION, POWDER, FOR SOLUTION INTRAMUSCULAR; INTRAVENOUS at 17:10

## 2017-07-15 RX ADMIN — IPRATROPIUM BROMIDE AND ALBUTEROL SULFATE SCH AMP: .5; 3 SOLUTION RESPIRATORY (INHALATION) at 18:03

## 2017-07-15 RX ADMIN — METHYLPREDNISOLONE SODIUM SUCCINATE SCH MG: 40 INJECTION, POWDER, FOR SOLUTION INTRAMUSCULAR; INTRAVENOUS at 09:37

## 2017-07-15 RX ADMIN — METHYLPREDNISOLONE SODIUM SUCCINATE SCH MG: 40 INJECTION, POWDER, FOR SOLUTION INTRAMUSCULAR; INTRAVENOUS at 02:02

## 2017-07-15 NOTE — PN
Progress Note, Physician


History of Present Illness: 


looks much better


sitting


c/o of cough


otherwise doing well





- Current Medication List


Current Medications: 


Active Medications





Albuterol Sulfate (Ventolin 0.083% Nebulizer Soln -)  1 amp NEB Q4H PRN


   PRN Reason: SHORT OF BREATH/WHEEZING


   Last Admin: 07/12/17 08:51 Dose:  1 amp


Albuterol/Ipratropium (Duoneb -)  1 amp NEB QIDR [facility]


   Last Admin: 07/15/17 11:12 Dose:  1 amp


Azithromycin (Zithromax 500mg Ivpb (Pre-Docked))  500 mg IVPB DAILY [facility]


   Last Admin: 07/15/17 11:13 Dose:  500 mg


Cefepime HCl (Maxipime 1gm Ivpb Pre-Docked)  1 gm IVPB Q8H [facility]


   PRN Reason: Protocol


   Last Admin: 07/15/17 08:54 Dose:  1 gm


Folic Acid (Folic Acid -)  1 mg PO DAILY [facility]


   Last Admin: 07/15/17 09:43 Dose:  1 mg


Ibuprofen (Motrin -)  400 mg PO Q6H PRN


   PRN Reason: PAIN


   Last Admin: 07/13/17 05:48 Dose:  400 mg


Insulin Aspart (Novolog Vial Sliding Scale -)  1 vial SQ BIDAC [facility]


   PRN Reason: Protocol


Methylprednisolone Sodium Succinate (Solu-Medrol -)  40 mg IVPB Q8H-IV [facility]


   Last Admin: 07/15/17 09:37 Dose:  40 mg


Multivitamins/Minerals/Vitamin C (Tab-A-Vit -)  1 tab PO DAILY [facility]


   Last Admin: 07/15/17 09:45 Dose:  1 tab


Potassium Chloride (K-Dur -)  40 meq PO DAILY [facility]


   Last Admin: 07/15/17 09:45 Dose:  40 meq


Thiamine HCl (Vitamin B1 -)  100 mg PO BID [facility]


   Last Admin: 07/15/17 09:44 Dose:  100 mg











- Objective


Vital Signs: 


 Vital Signs











Temperature  97.9 F   07/15/17 06:00


 


Pulse Rate  86   07/15/17 11:11


 


Respiratory Rate  20   07/15/17 06:00


 


Blood Pressure  117/71   07/15/17 06:00


 


O2 Sat by Pulse Oximetry (%)  91 L  07/15/17 11:11











Constitutional: Yes: No Distress, Calm


HENT: Yes: Atraumatic, Normocephalic


Neck: Yes: Supple


Cardiovascular: Yes: Regular Rate and Rhythm


Respiratory: Yes: Regular, CTA Bilaterally


Gastrointestinal: Yes: Normal Bowel Sounds, Soft


Musculoskeletal: Yes: WNL


Extremities: Yes: WNL


Neurological: Yes: Alert, Oriented


Psychiatric: Yes: Alert, Oriented


Labs: 


 CBC, BMP





 07/15/17 11:45 





 07/15/17 11:45 





 INR, PTT











INR  1.49  (0.82-1.09)  H  07/13/17  08:05    














Assessment/Plan


 Problem List 





- Problems


(1) ETOH abuse


Code(s): F10.10 - ALCOHOL ABUSE, UNCOMPLICATED





(2) Hypokalemia


Code(s): E87.6 - HYPOKALEMIA





(3) Hyponatremia


Code(s): E87.1 - HYPO-OSMOLALITY AND HYPONATREMIA





(4) Alcohol dependence with uncomplicated withdrawal


Code(s): F10.230 - ALCOHOL DEPENDENCE WITH WITHDRAWAL, UNCOMPLICATED





5 fever





6 asp pna





looking at the xray i think patient has aspirated





plan


continue abx


will consider stopping abx by monday


rest as per primary

## 2017-07-15 NOTE — PN
Progress Note, Physician


History of Present Illness: 


PULMONARY





ALERT,FEELING BETTER, LESS CONGESTED,LESS COUGH





- Current Medication List


Current Medications: 


Active Medications





Albuterol Sulfate (Ventolin 0.083% Nebulizer Soln -)  1 amp NEB Q4H PRN


   PRN Reason: SHORT OF BREATH/WHEEZING


   Last Admin: 07/12/17 08:51 Dose:  1 amp


Albuterol/Ipratropium (Duoneb -)  1 amp NEB QIDR [facility]


   Last Admin: 07/15/17 11:12 Dose:  1 amp


Azithromycin (Zithromax 500mg Ivpb (Pre-Docked))  500 mg IVPB DAILY [facility]


   Last Admin: 07/14/17 11:48 Dose:  500 mg


Cefepime HCl (Maxipime 1gm Ivpb Pre-Docked)  1 gm IVPB Q8H [facility]


   PRN Reason: Protocol


   Last Admin: 07/15/17 08:54 Dose:  1 gm


Folic Acid (Folic Acid -)  1 mg PO DAILY [facility]


   Last Admin: 07/15/17 09:43 Dose:  1 mg


Ibuprofen (Motrin -)  400 mg PO Q6H PRN


   PRN Reason: PAIN


   Last Admin: 07/13/17 05:48 Dose:  400 mg


Insulin Aspart (Novolog Vial Sliding Scale -)  1 vial SQ BIDAC [facility]


   PRN Reason: Protocol


Methylprednisolone Sodium Succinate (Solu-Medrol -)  40 mg IVPB Q8H-IV [facility]


   Last Admin: 07/15/17 09:37 Dose:  40 mg


Multivitamins/Minerals/Vitamin C (Tab-A-Vit -)  1 tab PO DAILY [facility]


   Last Admin: 07/15/17 09:45 Dose:  1 tab


Potassium Chloride (K-Dur -)  40 meq PO DAILY [facility]


   Last Admin: 07/15/17 09:45 Dose:  40 meq


Thiamine HCl (Vitamin B1 -)  100 mg PO BID [facility]


   Last Admin: 07/15/17 09:44 Dose:  100 mg











- Objective


Vital Signs: 


 Vital Signs











Temperature  97.9 F   07/15/17 06:00


 


Pulse Rate  86   07/15/17 11:11


 


Respiratory Rate  20   07/15/17 06:00


 


Blood Pressure  117/71   07/15/17 06:00


 


O2 Sat by Pulse Oximetry (%)  91 L  07/15/17 11:11











Constitutional: Yes: Well Nourished, Calm


Eyes: Yes: WNL


HENT: Yes: WNL


Neck: Yes: WNL


Cardiovascular: Yes: Regular Rate and Rhythm, S1, S2


Respiratory: Yes: Rales (BILATERAL CRACKLE L>R)


Extremities: Yes: WNL


Edema: No


Labs: 


 CBC, BMP





 





Problem List





- Problems


(1) ETOH abuse


Code(s): F10.10 - ALCOHOL ABUSE, UNCOMPLICATED





(2) Head trauma


Code(s): S09.90XA - UNSPECIFIED INJURY OF HEAD, INITIAL ENCOUNTER   





(3) Hypokalemia


Code(s): E87.6 - HYPOKALEMIA





(4) Hyponatremia


Code(s): E87.1 - HYPO-OSMOLALITY AND HYPONATREMIA





(5) Left upper lobe pneumonia


Code(s): J18.1 - LOBAR PNEUMONIA, UNSPECIFIED ORGANISM   





(6) Sepsis associated hypotension


Code(s): A41.9 - SEPSIS, UNSPECIFIED ORGANISM





(7) Alcohol dependence with uncomplicated withdrawal


Code(s): F10.230 - ALCOHOL DEPENDENCE WITH WITHDRAWAL, UNCOMPLICATED





(8) Hand abrasion


Code(s): S60.519A - ABRASION OF UNSPECIFIED HAND, INITIAL ENCOUNTER   Qualifiers

: 


     Laterality: right





(9) Alcoholic liver disease


Code(s): K70.9 - ALCOHOLIC LIVER DISEASE, UNSPECIFIED





(10) DM Diabetes mellitus type 2


Code(s): E11.9 - TYPE 2 DIABETES MELLITUS WITHOUT COMPLICATIONS





(11) Acute hypoxemic respiratory failure


Code(s): J96.01 - ACUTE RESPIRATORY FAILURE WITH HYPOXIA








Assessment/Plan


IMP ACUTE HYPOXEMIC RESPIRATORY FAILURE


      SEPSIS


      PNEUMONIA  CAP,?ASPIRATION


      HYPONATREMIA  


      ETOH


      ELEVATED LFTS


      S/P FALL


      THROMBOCYTOPENIA





PLAN ANTIBIOTICS AS PER ID


        IVF


        INHALED BRONCHODILATORS


         STEROIDS 


         MONITORS LYTES/NA,LFTS


         


         


        


         





DR REYEZ


      


      





 Problem List 





- Problems


(1) ETOH abuse


Code(s): F10.10 - ALCOHOL ABUSE, UNCOMPLICATED





(2) Head trauma


Code(s): S09.90XA - UNSPECIFIED INJURY OF HEAD, INITIAL ENCOUNTER   





(3) Hypokalemia


Code(s): E87.6 - HYPOKALEMIA





(4) Hyponatremia


Code(s): E87.1 - HYPO-OSMOLALITY AND HYPONATREMIA





(5) Left upper lobe pneumonia


Code(s): J18.1 - LOBAR PNEUMONIA, UNSPECIFIED ORGANISM   





(6) Sepsis associated hypotension


Code(s): A41.9 - SEPSIS, UNSPECIFIED ORGANISM





(7) Alcohol dependence with uncomplicated withdrawal


Code(s): F10.230 - ALCOHOL DEPENDENCE WITH WITHDRAWAL, UNCOMPLICATED





(8) Hand abrasion


Code(s): S60.519A - ABRASION OF UNSPECIFIED HAND, INITIAL ENCOUNTER   Qualifiers

: 


     Laterality: right





(9) Alcoholic liver disease


Code(s): K70.9 - ALCOHOLIC LIVER DISEASE, UNSPECIFIED





(10) DM Diabetes mellitus type 2


Code(s): E11.9 - TYPE 2 DIABETES MELLITUS WITHOUT COMPLICATIONS





(11) Acute hypoxemic respiratory failure


Code(s): J96.01 - ACUTE RESPIRATORY FAILURE WITH HYPOXIA

## 2017-07-15 NOTE — PN
Progress Note, Physician





- Current Medication List


Current Medications: 


Active Medications





Albuterol Sulfate (Ventolin 0.083% Nebulizer Soln -)  1 amp NEB Q4H PRN


   PRN Reason: SHORT OF BREATH/WHEEZING


   Last Admin: 07/12/17 08:51 Dose:  1 amp


Albuterol/Ipratropium (Duoneb -)  1 amp NEB QIDR [facility]


   Last Admin: 07/15/17 23:08 Dose:  1 amp


Azithromycin (Zithromax 500mg Ivpb (Pre-Docked))  500 mg IVPB DAILY [facility]


   Last Admin: 07/15/17 11:13 Dose:  500 mg


Cefepime HCl (Maxipime 1gm Ivpb Pre-Docked)  1 gm IVPB Q8H [facility]


   PRN Reason: Protocol


   Last Admin: 07/15/17 15:36 Dose:  1 gm


Folic Acid (Folic Acid -)  1 mg PO DAILY [facility]


   Last Admin: 07/15/17 09:43 Dose:  1 mg


Ibuprofen (Motrin -)  400 mg PO Q6H PRN


   PRN Reason: PAIN


   Last Admin: 07/13/17 05:48 Dose:  400 mg


Insulin Aspart (Novolog Vial Sliding Scale -)  1 vial SQ BIDAC [facility]


   PRN Reason: Protocol


   Last Admin: 07/15/17 16:11 Dose:  8 units


Methylprednisolone Sodium Succinate (Solu-Medrol -)  40 mg IVPB Q8H-IV [facility]


   Last Admin: 07/15/17 17:10 Dose:  40 mg


Multivitamins/Minerals/Vitamin C (Tab-A-Vit -)  1 tab PO DAILY [facility]


   Last Admin: 07/15/17 09:45 Dose:  1 tab


Potassium Chloride (K-Dur -)  40 meq PO DAILY [facility]


   Last Admin: 07/15/17 09:45 Dose:  40 meq


Thiamine HCl (Vitamin B1 -)  100 mg PO BID [facility]


   Last Admin: 07/15/17 22:09 Dose:  100 mg











- Objective


Vital Signs: 


 Vital Signs











Temperature  98.7 F   07/15/17 18:00


 


Pulse Rate  82   07/15/17 18:00


 


Respiratory Rate  18   07/15/17 18:00


 


Blood Pressure  125/72   07/15/17 18:00


 


O2 Sat by Pulse Oximetry (%)  91 L  07/15/17 11:11











Labs: 


 CBC, BMP





 07/15/17 11:45 





 07/15/17 11:45 





 INR, PTT











INR  1.49  (0.82-1.09)  H  07/13/17  08:05

## 2017-07-16 RX ADMIN — INSULIN ASPART SCH UNITS: 100 INJECTION, SOLUTION INTRAVENOUS; SUBCUTANEOUS at 06:25

## 2017-07-16 RX ADMIN — IPRATROPIUM BROMIDE AND ALBUTEROL SULFATE SCH AMP: .5; 3 SOLUTION RESPIRATORY (INHALATION) at 23:30

## 2017-07-16 RX ADMIN — Medication SCH MG: at 09:15

## 2017-07-16 RX ADMIN — METHYLPREDNISOLONE SODIUM SUCCINATE SCH MG: 40 INJECTION, POWDER, FOR SOLUTION INTRAMUSCULAR; INTRAVENOUS at 09:15

## 2017-07-16 RX ADMIN — METHYLPREDNISOLONE SODIUM SUCCINATE SCH MG: 40 INJECTION, POWDER, FOR SOLUTION INTRAMUSCULAR; INTRAVENOUS at 22:53

## 2017-07-16 RX ADMIN — CEFEPIME HYDROCHLORIDE SCH GM: 1 INJECTION, POWDER, FOR SOLUTION INTRAMUSCULAR; INTRAVENOUS at 15:53

## 2017-07-16 RX ADMIN — Medication SCH MG: at 22:53

## 2017-07-16 RX ADMIN — CEFEPIME HYDROCHLORIDE SCH GM: 1 INJECTION, POWDER, FOR SOLUTION INTRAMUSCULAR; INTRAVENOUS at 08:21

## 2017-07-16 RX ADMIN — MULTIVITAMIN TABLET SCH TAB: TABLET at 09:15

## 2017-07-16 RX ADMIN — METHYLPREDNISOLONE SODIUM SUCCINATE SCH MG: 40 INJECTION, POWDER, FOR SOLUTION INTRAMUSCULAR; INTRAVENOUS at 01:45

## 2017-07-16 RX ADMIN — POTASSIUM CHLORIDE SCH MEQ: 1500 TABLET, EXTENDED RELEASE ORAL at 09:15

## 2017-07-16 RX ADMIN — IPRATROPIUM BROMIDE AND ALBUTEROL SULFATE SCH AMP: .5; 3 SOLUTION RESPIRATORY (INHALATION) at 06:50

## 2017-07-16 RX ADMIN — IPRATROPIUM BROMIDE AND ALBUTEROL SULFATE SCH AMP: .5; 3 SOLUTION RESPIRATORY (INHALATION) at 17:56

## 2017-07-16 RX ADMIN — CEFEPIME HYDROCHLORIDE SCH GM: 1 INJECTION, POWDER, FOR SOLUTION INTRAMUSCULAR; INTRAVENOUS at 01:45

## 2017-07-16 RX ADMIN — INSULIN ASPART SCH UNITS: 100 INJECTION, SOLUTION INTRAVENOUS; SUBCUTANEOUS at 16:52

## 2017-07-16 RX ADMIN — IPRATROPIUM BROMIDE AND ALBUTEROL SULFATE SCH AMP: .5; 3 SOLUTION RESPIRATORY (INHALATION) at 11:04

## 2017-07-16 RX ADMIN — FOLIC ACID SCH MG: 1 TABLET ORAL at 09:15

## 2017-07-16 RX ADMIN — AZITHROMYCIN DIHYDRATE SCH MG: 500 INJECTION, POWDER, LYOPHILIZED, FOR SOLUTION INTRAVENOUS at 10:19

## 2017-07-16 RX ADMIN — GUAIFENESIN AND CODEINE PHOSPHATE PRN ML: 10; 100 LIQUID ORAL at 12:31

## 2017-07-16 NOTE — PN
Progress Note, Physician





- Current Medication List


Current Medications: 


Active Medications





Albuterol Sulfate (Ventolin 0.083% Nebulizer Soln -)  1 amp NEB Q4H PRN


   PRN Reason: SHORT OF BREATH/WHEEZING


   Last Admin: 07/12/17 08:51 Dose:  1 amp


Albuterol/Ipratropium (Duoneb -)  1 amp NEB QIDR [facility]


   Last Admin: 07/16/17 17:56 Dose:  1 amp


Azithromycin (Zithromax 500mg Ivpb (Pre-Docked))  500 mg IVPB DAILY [facility]


   Last Admin: 07/16/17 10:19 Dose:  500 mg


Cefepime HCl (Maxipime 1gm Ivpb Pre-Docked)  1 gm IVPB Q8H [facility]


   PRN Reason: Protocol


   Last Admin: 07/16/17 15:53 Dose:  1 gm


Folic Acid (Folic Acid -)  1 mg PO DAILY [facility]


   Last Admin: 07/16/17 09:15 Dose:  1 mg


Guaifenesin/Codeine Phosphate (Robitussin Ac -)  10 ml PO Q8H PRN


   PRN Reason: COUGH


   Last Admin: 07/16/17 12:31 Dose:  10 ml


Ibuprofen (Motrin -)  400 mg PO Q6H PRN


   PRN Reason: PAIN


   Last Admin: 07/13/17 05:48 Dose:  400 mg


Insulin Aspart (Novolog Vial Sliding Scale -)  1 vial SQ BIDAC [facility]


   PRN Reason: Protocol


   Last Admin: 07/16/17 16:52 Dose:  10 units


Methylprednisolone Sodium Succinate (Solu-Medrol -)  40 mg IVPB Q12H [facility]


Multivitamins/Minerals/Vitamin C (Tab-A-Vit -)  1 tab PO DAILY [facility]


   Last Admin: 07/16/17 09:15 Dose:  1 tab


Potassium Chloride (K-Dur -)  40 meq PO DAILY [facility]


   Last Admin: 07/16/17 09:15 Dose:  40 meq


Thiamine HCl (Vitamin B1 -)  100 mg PO BID [facility]


   Last Admin: 07/16/17 09:15 Dose:  100 mg











- Objective


Vital Signs: 


 Vital Signs











Temperature  98.2 F   07/16/17 18:00


 


Pulse Rate  78   07/16/17 18:00


 


Respiratory Rate  18   07/16/17 18:00


 


Blood Pressure  135/77   07/16/17 18:00


 


O2 Sat by Pulse Oximetry (%)  91 L  07/16/17 10:46











Labs: 


 CBC, BMP





 07/15/17 11:45 





 07/15/17 11:45 





 INR, PTT











INR  1.49  (0.82-1.09)  H  07/13/17  08:05

## 2017-07-16 NOTE — PN
Progress Note, Physician


History of Present Illness: 


PULMONARY





ALERT,FEELING BETTER,+COUGH,LESS DYSPNEIC





- Current Medication List


Current Medications: 


Active Medications





Albuterol Sulfate (Ventolin 0.083% Nebulizer Soln -)  1 amp NEB Q4H PRN


   PRN Reason: SHORT OF BREATH/WHEEZING


   Last Admin: 07/12/17 08:51 Dose:  1 amp


Albuterol/Ipratropium (Duoneb -)  1 amp NEB QIDR [facility]


   Last Admin: 07/16/17 11:04 Dose:  1 amp


Azithromycin (Zithromax 500mg Ivpb (Pre-Docked))  500 mg IVPB DAILY [facility]


   Last Admin: 07/16/17 10:19 Dose:  500 mg


Cefepime HCl (Maxipime 1gm Ivpb Pre-Docked)  1 gm IVPB Q8H [facility]


   PRN Reason: Protocol


   Last Admin: 07/16/17 08:21 Dose:  1 gm


Folic Acid (Folic Acid -)  1 mg PO DAILY [facility]


   Last Admin: 07/16/17 09:15 Dose:  1 mg


Ibuprofen (Motrin -)  400 mg PO Q6H PRN


   PRN Reason: PAIN


   Last Admin: 07/13/17 05:48 Dose:  400 mg


Insulin Aspart (Novolog Vial Sliding Scale -)  1 vial SQ BIDAC [facility]


   PRN Reason: Protocol


   Last Admin: 07/16/17 06:25 Dose:  6 units


Methylprednisolone Sodium Succinate (Solu-Medrol -)  40 mg IVPB Q8H-IV [facility]


   Last Admin: 07/16/17 09:15 Dose:  40 mg


Multivitamins/Minerals/Vitamin C (Tab-A-Vit -)  1 tab PO DAILY [facility]


   Last Admin: 07/16/17 09:15 Dose:  1 tab


Potassium Chloride (K-Dur -)  40 meq PO DAILY [facility]


   Last Admin: 07/16/17 09:15 Dose:  40 meq


Thiamine HCl (Vitamin B1 -)  100 mg PO BID [facility]


   Last Admin: 07/16/17 09:15 Dose:  100 mg











- Objective


Vital Signs: 


 Vital Signs











Temperature  97.5 F L  07/16/17 09:49


 


Pulse Rate  80   07/16/17 10:46


 


Respiratory Rate  18   07/16/17 09:49


 


Blood Pressure  107/59   07/16/17 09:49


 


O2 Sat by Pulse Oximetry (%)  91 L  07/16/17 10:46











Constitutional: Yes: Well Nourished, Calm


Eyes: Yes: WNL


HENT: Yes: WNL


Neck: Yes: WNL


Cardiovascular: Yes: Regular Rate and Rhythm, S1, S2


Respiratory: Yes: Rales


Gastrointestinal: Yes: Normal Bowel Sounds, Soft


Extremities: Yes: WNL


Edema: No


Labs: 


 CBC, BMP








Problem List





- Problems


(1) ETOH abuse


Code(s): F10.10 - ALCOHOL ABUSE, UNCOMPLICATED





(2) Head trauma


Code(s): S09.90XA - UNSPECIFIED INJURY OF HEAD, INITIAL ENCOUNTER   





(3) Hypokalemia


Code(s): E87.6 - HYPOKALEMIA





(4) Hyponatremia


Code(s): E87.1 - HYPO-OSMOLALITY AND HYPONATREMIA





(5) Left upper lobe pneumonia


Code(s): J18.1 - LOBAR PNEUMONIA, UNSPECIFIED ORGANISM   





(6) Sepsis associated hypotension


Code(s): A41.9 - SEPSIS, UNSPECIFIED ORGANISM





(7) Alcohol dependence with uncomplicated withdrawal


Code(s): F10.230 - ALCOHOL DEPENDENCE WITH WITHDRAWAL, UNCOMPLICATED





(8) Hand abrasion


Code(s): S60.519A - ABRASION OF UNSPECIFIED HAND, INITIAL ENCOUNTER   Qualifiers

: 


     Laterality: right





(9) Alcoholic liver disease


Code(s): K70.9 - ALCOHOLIC LIVER DISEASE, UNSPECIFIED





(10) DM Diabetes mellitus type 2


Code(s): E11.9 - TYPE 2 DIABETES MELLITUS WITHOUT COMPLICATIONS





(11) Acute hypoxemic respiratory failure


Code(s): J96.01 - ACUTE RESPIRATORY FAILURE WITH HYPOXIA








Assessment/Plan


IMP ACUTE HYPOXEMIC RESPIRATORY FAILURE IMPROVING


      SEPSIS


      PNEUMONIA  CAP,?ASPIRATION


      HYPONATREMIA  


      ETOH


      ELEVATED LFTS


      S/P FALL


      THROMBOCYTOPENIA





PLAN ANTIBIOTICS AS PER ID


        IVF


        INHALED BRONCHODILATORS


         STEROID TAPER


         MONITORS LYTES/NA,LFTS


         F/U CHEST X-RAY 


         ANTITUSSIVES


         


         


        


         





DR REYEZ


      


      





 Problem List 





- Problems


(1) ETOH abuse


Code(s): F10.10 - ALCOHOL ABUSE, UNCOMPLICATED





(2) Head trauma


Code(s): S09.90XA - UNSPECIFIED INJURY OF HEAD, INITIAL ENCOUNTER   





(3) Hypokalemia


Code(s): E87.6 - HYPOKALEMIA





(4) Hyponatremia


Code(s): E87.1 - HYPO-OSMOLALITY AND HYPONATREMIA





(5) Left upper lobe pneumonia


Code(s): J18.1 - LOBAR PNEUMONIA, UNSPECIFIED ORGANISM   





(6) Sepsis associated hypotension


Code(s): A41.9 - SEPSIS, UNSPECIFIED ORGANISM





(7) Alcohol dependence with uncomplicated withdrawal


Code(s): F10.230 - ALCOHOL DEPENDENCE WITH WITHDRAWAL, UNCOMPLICATED





(8) Hand abrasion


Code(s): S60.519A - ABRASION OF UNSPECIFIED HAND, INITIAL ENCOUNTER   Qualifiers

: 


     Laterality: right





(9) Alcoholic liver disease


Code(s): K70.9 - ALCOHOLIC LIVER DISEASE, UNSPECIFIED





(10) DM Diabetes mellitus type 2


Code(s): E11.9 - TYPE 2 DIABETES MELLITUS WITHOUT COMPLICATIONS





(11) Acute hypoxemic respiratory failure


Code(s): J96.01 - ACUTE RESPIRATORY FAILURE WITH HYPOXIA

## 2017-07-16 NOTE — PN
Progress Note, Physician


History of Present Illness: 


continues to improve


no new issues








- Current Medication List


Current Medications: 


Active Medications





Albuterol Sulfate (Ventolin 0.083% Nebulizer Soln -)  1 amp NEB Q4H PRN


   PRN Reason: SHORT OF BREATH/WHEEZING


   Last Admin: 07/12/17 08:51 Dose:  1 amp


Albuterol/Ipratropium (Duoneb -)  1 amp NEB QIDR [facility]


   Last Admin: 07/16/17 11:04 Dose:  1 amp


Azithromycin (Zithromax 500mg Ivpb (Pre-Docked))  500 mg IVPB DAILY [facility]


   Last Admin: 07/16/17 10:19 Dose:  500 mg


Cefepime HCl (Maxipime 1gm Ivpb Pre-Docked)  1 gm IVPB Q8H [facility]


   PRN Reason: Protocol


   Last Admin: 07/16/17 08:21 Dose:  1 gm


Folic Acid (Folic Acid -)  1 mg PO DAILY [facility]


   Last Admin: 07/16/17 09:15 Dose:  1 mg


Guaifenesin/Codeine Phosphate (Robitussin Ac -)  10 ml PO Q8H PRN


   PRN Reason: COUGH


   Last Admin: 07/16/17 12:31 Dose:  10 ml


Ibuprofen (Motrin -)  400 mg PO Q6H PRN


   PRN Reason: PAIN


   Last Admin: 07/13/17 05:48 Dose:  400 mg


Insulin Aspart (Novolog Vial Sliding Scale -)  1 vial SQ BIDAC [facility]


   PRN Reason: Protocol


   Last Admin: 07/16/17 06:25 Dose:  6 units


Methylprednisolone Sodium Succinate (Solu-Medrol -)  40 mg IVPB Q12H [facility]


Multivitamins/Minerals/Vitamin C (Tab-A-Vit -)  1 tab PO DAILY [facility]


   Last Admin: 07/16/17 09:15 Dose:  1 tab


Potassium Chloride (K-Dur -)  40 meq PO DAILY [facility]


   Last Admin: 07/16/17 09:15 Dose:  40 meq


Thiamine HCl (Vitamin B1 -)  100 mg PO BID [facility]


   Last Admin: 07/16/17 09:15 Dose:  100 mg











- Objective


Vital Signs: 


 Vital Signs











Temperature  97.5 F L  07/16/17 09:49


 


Pulse Rate  80   07/16/17 10:46


 


Respiratory Rate  18   07/16/17 09:49


 


Blood Pressure  107/59   07/16/17 09:49


 


O2 Sat by Pulse Oximetry (%)  91 L  07/16/17 10:46











Constitutional: Yes: No Distress, Calm


Cardiovascular: Yes: Regular Rate and Rhythm


Respiratory: Yes: Regular, Rhonchi (left side), Other


Gastrointestinal: Yes: Normal Bowel Sounds, Soft


Musculoskeletal: Yes: WNL


Extremities: Yes: WNL


Neurological: Yes: Alert, Oriented


Psychiatric: Yes: Alert


Labs: 


 CBC, BMP





 07/15/17 11:45 





 07/15/17 11:45 





 INR, PTT











INR  1.49  (0.82-1.09)  H  07/13/17  08:05    














Assessment/Plan


 Problem List 





- Problems


(1) ETOH abuse


Code(s): F10.10 - ALCOHOL ABUSE, UNCOMPLICATED





(2) Hypokalemia


Code(s): E87.6 - HYPOKALEMIA





(3) Hyponatremia


Code(s): E87.1 - HYPO-OSMOLALITY AND HYPONATREMIA





(4) Alcohol dependence with uncomplicated withdrawal


Code(s): F10.230 - ALCOHOL DEPENDENCE WITH WITHDRAWAL, UNCOMPLICATED





5 fever





6 asp pna





looking at the xray i think patient has aspirated





plan


continue abx


rest as per primary

## 2017-07-17 RX ADMIN — GUAIFENESIN AND CODEINE PHOSPHATE PRN ML: 10; 100 LIQUID ORAL at 22:42

## 2017-07-17 RX ADMIN — IPRATROPIUM BROMIDE AND ALBUTEROL SULFATE SCH AMP: .5; 3 SOLUTION RESPIRATORY (INHALATION) at 17:23

## 2017-07-17 RX ADMIN — MULTIVITAMIN TABLET SCH TAB: TABLET at 10:08

## 2017-07-17 RX ADMIN — IPRATROPIUM BROMIDE AND ALBUTEROL SULFATE SCH AMP: .5; 3 SOLUTION RESPIRATORY (INHALATION) at 11:48

## 2017-07-17 RX ADMIN — Medication SCH MG: at 22:36

## 2017-07-17 RX ADMIN — METHYLPREDNISOLONE SODIUM SUCCINATE SCH: 40 INJECTION, POWDER, FOR SOLUTION INTRAMUSCULAR; INTRAVENOUS at 10:15

## 2017-07-17 RX ADMIN — AZITHROMYCIN DIHYDRATE SCH MG: 500 INJECTION, POWDER, LYOPHILIZED, FOR SOLUTION INTRAVENOUS at 12:07

## 2017-07-17 RX ADMIN — CEFEPIME HYDROCHLORIDE SCH GM: 1 INJECTION, POWDER, FOR SOLUTION INTRAMUSCULAR; INTRAVENOUS at 01:30

## 2017-07-17 RX ADMIN — IPRATROPIUM BROMIDE AND ALBUTEROL SULFATE SCH AMP: .5; 3 SOLUTION RESPIRATORY (INHALATION) at 06:45

## 2017-07-17 RX ADMIN — POTASSIUM CHLORIDE SCH MEQ: 1500 TABLET, EXTENDED RELEASE ORAL at 10:08

## 2017-07-17 RX ADMIN — INSULIN ASPART SCH UNITS: 100 INJECTION, SOLUTION INTRAVENOUS; SUBCUTANEOUS at 06:57

## 2017-07-17 RX ADMIN — Medication SCH MG: at 10:08

## 2017-07-17 RX ADMIN — INSULIN ASPART SCH UNITS: 100 INJECTION, SOLUTION INTRAVENOUS; SUBCUTANEOUS at 17:47

## 2017-07-17 RX ADMIN — METHYLPREDNISOLONE SODIUM SUCCINATE SCH MG: 40 INJECTION, POWDER, FOR SOLUTION INTRAMUSCULAR; INTRAVENOUS at 11:19

## 2017-07-17 RX ADMIN — CEFEPIME HYDROCHLORIDE SCH GM: 1 INJECTION, POWDER, FOR SOLUTION INTRAMUSCULAR; INTRAVENOUS at 09:03

## 2017-07-17 RX ADMIN — FOLIC ACID SCH MG: 1 TABLET ORAL at 10:08

## 2017-07-17 RX ADMIN — CEFEPIME HYDROCHLORIDE SCH GM: 1 INJECTION, POWDER, FOR SOLUTION INTRAMUSCULAR; INTRAVENOUS at 17:47

## 2017-07-17 NOTE — PN
Progress Note, Physician


History of Present Illness: 


doing well





- Current Medication List


Current Medications: 


Active Medications





Azithromycin (Zithromax 500mg Ivpb (Pre-Docked))  500 mg IVPB DAILY [facility]


   Last Admin: 07/17/17 12:07 Dose:  500 mg


Cefepime HCl (Maxipime 1gm Ivpb Pre-Docked)  1 gm IVPB Q8H [facility]


   PRN Reason: Protocol


   Last Admin: 07/17/17 17:47 Dose:  1 gm


Folic Acid (Folic Acid -)  1 mg PO DAILY [facility]


   Last Admin: 07/17/17 10:08 Dose:  1 mg


Guaifenesin/Codeine Phosphate (Robitussin Ac -)  10 ml PO Q8H PRN


   PRN Reason: COUGH


   Last Admin: 07/16/17 12:31 Dose:  10 ml


Ibuprofen (Motrin -)  400 mg PO Q6H PRN


   PRN Reason: PAIN


   Last Admin: 07/13/17 05:48 Dose:  400 mg


Insulin Aspart (Novolog Vial Sliding Scale -)  1 vial SQ BIDAC [facility]


   PRN Reason: Protocol


   Last Admin: 07/17/17 17:47 Dose:  10 units


Multivitamins/Minerals/Vitamin C (Tab-A-Vit -)  1 tab PO DAILY [facility]


   Last Admin: 07/17/17 10:08 Dose:  1 tab


Potassium Chloride (K-Dur -)  40 meq PO DAILY [facility]


   Last Admin: 07/17/17 10:08 Dose:  40 meq


Prednisone (Deltasone -)  40 mg PO DAILY [facility]


Thiamine HCl (Vitamin B1 -)  100 mg PO BID [facility]


   Last Admin: 07/17/17 10:08 Dose:  100 mg











- Objective


Vital Signs: 


 Vital Signs











Temperature  98.3 F   07/17/17 14:45


 


Pulse Rate  83   07/17/17 14:45


 


Respiratory Rate  18   07/17/17 14:45


 


Blood Pressure  106/65   07/17/17 14:45


 


O2 Sat by Pulse Oximetry (%)  95   07/17/17 13:10











Constitutional: Yes: No Distress


HENT: Yes: Atraumatic


Neck: Yes: Supple


Cardiovascular: Yes: Regular Rate and Rhythm


Respiratory: Yes: Rhonchi


Gastrointestinal: Yes: Normal Bowel Sounds


Extremities: Yes: WNL


Neurological: Yes: Alert, Oriented


Labs: 


 CBC, BMP





 07/15/17 11:45 





 07/15/17 11:45 





 INR, PTT











INR  1.49  (0.82-1.09)  H  07/13/17  08:05    














Problem List





- Problems


(1) ETOH abuse


Assessment/Plan: 


 stable


ON FOLIC ACID AND THIAMINE


Code(s): F10.10 - ALCOHOL ABUSE, UNCOMPLICATED





(2) Hypokalemia


Assessment/Plan: 


RESOLVED





Code(s): E87.6 - HYPOKALEMIA





(3) Hyponatremia


Assessment/Plan: 


RESOLVING


Code(s): E87.1 - HYPO-OSMOLALITY AND HYPONATREMIA





(4) Alcohol dependence with uncomplicated withdrawal


Assessment/Plan: 


STABLE


Code(s): F10.230 - ALCOHOL DEPENDENCE WITH WITHDRAWAL, UNCOMPLICATED





(5) Alcoholic liver disease


Code(s): K70.9 - ALCOHOLIC LIVER DISEASE, UNSPECIFIED





(6) DM Diabetes mellitus type 2


Assessment/Plan: 


ON INSULIN


WILL START METFORMIN


Code(s): E11.9 - TYPE 2 DIABETES MELLITUS WITHOUT COMPLICATIONS





(7) HTN (hypertension)


Assessment/Plan: 


bp MEDS STILL ON HOLD


Code(s): I10 - ESSENTIAL (PRIMARY) HYPERTENSION   





(8) Left upper lobe pneumonia


Assessment/Plan: 


SWITCH TO PO ABX AND PREDNISONE FROM TOMORROW


Code(s): J18.1 - LOBAR PNEUMONIA, UNSPECIFIED ORGANISM   








Assessment/Plan


DC IN AM IF STABLE





pt wants to go to [facility] for rehab

## 2017-07-17 NOTE — PN
Progress Note, Physician


History of Present Illness: 


patient looking much better


breathing much better


xray seen and results noted





- Current Medication List


Current Medications: 


Active Medications





Albuterol/Ipratropium (Duoneb -)  1 amp NEB QIDR [facility]


   Last Admin: 07/17/17 11:48 Dose:  1 amp


Azithromycin (Zithromax 500mg Ivpb (Pre-Docked))  500 mg IVPB DAILY [facility]


   Last Admin: 07/17/17 12:07 Dose:  500 mg


Cefepime HCl (Maxipime 1gm Ivpb Pre-Docked)  1 gm IVPB Q8H [facility]


   PRN Reason: Protocol


   Last Admin: 07/17/17 09:03 Dose:  1 gm


Folic Acid (Folic Acid -)  1 mg PO DAILY [facility]


   Last Admin: 07/17/17 10:08 Dose:  1 mg


Guaifenesin/Codeine Phosphate (Robitussin Ac -)  10 ml PO Q8H PRN


   PRN Reason: COUGH


   Last Admin: 07/16/17 12:31 Dose:  10 ml


Ibuprofen (Motrin -)  400 mg PO Q6H PRN


   PRN Reason: PAIN


   Last Admin: 07/13/17 05:48 Dose:  400 mg


Insulin Aspart (Novolog Vial Sliding Scale -)  1 vial SQ BIDAC [facility]


   PRN Reason: Protocol


   Last Admin: 07/17/17 06:57 Dose:  6 units


Methylprednisolone Sodium Succinate (Solu-Medrol -)  40 mg IVPB DAILY [facility]


Multivitamins/Minerals/Vitamin C (Tab-A-Vit -)  1 tab PO DAILY [facility]


   Last Admin: 07/17/17 10:08 Dose:  1 tab


Potassium Chloride (K-Dur -)  40 meq PO DAILY [facility]


   Last Admin: 07/17/17 10:08 Dose:  40 meq


Thiamine HCl (Vitamin B1 -)  100 mg PO BID [facility]


   Last Admin: 07/17/17 10:08 Dose:  100 mg











- Objective


Vital Signs: 


 Vital Signs











Temperature  97.8 F   07/17/17 12:07


 


Pulse Rate  73   07/17/17 11:49


 


Respiratory Rate  20   07/17/17 10:06


 


Blood Pressure  100/50   07/17/17 10:06


 


O2 Sat by Pulse Oximetry (%)  95   07/17/17 13:10











Constitutional: Yes: No Distress, Calm


Cardiovascular: Yes: Regular Rate and Rhythm


Respiratory: Yes: Regular, Rhonchi


Gastrointestinal: Yes: Normal Bowel Sounds, Soft


Musculoskeletal: Yes: WNL


Extremities: Yes: WNL


Neurological: Yes: Alert, Oriented


Psychiatric: Yes: Alert, Oriented


Labs: 


 CBC, BMP





 07/15/17 11:45 





 07/15/17 11:45 





 INR, PTT











INR  1.49  (0.82-1.09)  H  07/13/17  08:05    














Assessment/Plan


 Problem List 





Problem List





- Problems


(1) ETOH abuse


Code(s): F10.10 - ALCOHOL ABUSE, UNCOMPLICATED





(2) Head trauma


Code(s): S09.90XA - UNSPECIFIED INJURY OF HEAD, INITIAL ENCOUNTER   





(3) Hypokalemia


Code(s): E87.6 - HYPOKALEMIA





(4) Hyponatremia


Code(s): E87.1 - HYPO-OSMOLALITY AND HYPONATREMIA





(5) Left upper lobe pneumonia


Code(s): J18.1 - LOBAR PNEUMONIA, UNSPECIFIED ORGANISM   





(6) Sepsis associated hypotension


Code(s): A41.9 - SEPSIS, UNSPECIFIED ORGANISM





(7) Alcohol dependence with uncomplicated withdrawal


Code(s): F10.230 - ALCOHOL DEPENDENCE WITH WITHDRAWAL, UNCOMPLICATED





(8) Hand abrasion


Code(s): S60.519A - ABRASION OF UNSPECIFIED HAND, INITIAL ENCOUNTER   Qualifiers

: 


     Laterality: right





(9) Alcoholic liver disease


Code(s): K70.9 - ALCOHOLIC LIVER DISEASE, UNSPECIFIED





(10) DM Diabetes mellitus type 2


Code(s): E11.9 - TYPE 2 DIABETES MELLITUS WITHOUT COMPLICATIONS





(11) Acute hypoxemic respiratory failure


Code(s): J96.01 - ACUTE RESPIRATORY FAILURE WITH HYPOXIA








plan


continue abx


can switch to oral abx tomorrow


can give him augmentin 875 mg twice a day for another 5 days more


incentive sharif

## 2017-07-17 NOTE — PN
Progress Note, Physician


History of Present Illness: 


pulmopnary





alert,feeling better,less cough,sob improved





- Current Medication List


Current Medications: 


Active Medications





Albuterol/Ipratropium (Duoneb -)  1 amp NEB QIDR [facility]


   Last Admin: 07/17/17 11:48 Dose:  1 amp


Azithromycin (Zithromax 500mg Ivpb (Pre-Docked))  500 mg IVPB DAILY [facility]


   Last Admin: 07/17/17 12:07 Dose:  500 mg


Cefepime HCl (Maxipime 1gm Ivpb Pre-Docked)  1 gm IVPB Q8H [facility]


   PRN Reason: Protocol


   Last Admin: 07/17/17 09:03 Dose:  1 gm


Folic Acid (Folic Acid -)  1 mg PO DAILY [facility]


   Last Admin: 07/17/17 10:08 Dose:  1 mg


Guaifenesin/Codeine Phosphate (Robitussin Ac -)  10 ml PO Q8H PRN


   PRN Reason: COUGH


   Last Admin: 07/16/17 12:31 Dose:  10 ml


Ibuprofen (Motrin -)  400 mg PO Q6H PRN


   PRN Reason: PAIN


   Last Admin: 07/13/17 05:48 Dose:  400 mg


Insulin Aspart (Novolog Vial Sliding Scale -)  1 vial SQ BIDAC [facility]


   PRN Reason: Protocol


   Last Admin: 07/17/17 06:57 Dose:  6 units


Methylprednisolone Sodium Succinate (Solu-Medrol -)  40 mg IVPB Q12H [facility]


   Last Admin: 07/17/17 11:19 Dose:  40 mg


Multivitamins/Minerals/Vitamin C (Tab-A-Vit -)  1 tab PO DAILY [facility]


   Last Admin: 07/17/17 10:08 Dose:  1 tab


Potassium Chloride (K-Dur -)  40 meq PO DAILY [facility]


   Last Admin: 07/17/17 10:08 Dose:  40 meq


Thiamine HCl (Vitamin B1 -)  100 mg PO BID [facility]


   Last Admin: 07/17/17 10:08 Dose:  100 mg











- Objective


Vital Signs: 


 Vital Signs











Temperature  97.8 F   07/17/17 12:07


 


Pulse Rate  73   07/17/17 11:49


 


Respiratory Rate  20   07/17/17 10:06


 


Blood Pressure  100/50   07/17/17 10:06


 


O2 Sat by Pulse Oximetry (%)  95   07/17/17 13:10











Constitutional: Yes: Well Nourished, Calm


Eyes: Yes: WNL


HENT: Yes: WNL


Neck: Yes: WNL


Cardiovascular: Yes: Regular Rate and Rhythm, S1, S2


Respiratory: Yes: Rales (crackles on left)


Gastrointestinal: Yes: Normal Bowel Sounds, Soft


Extremities: Yes: WNL


Edema: No


Labs: 


 CBC, BMP








- ....Imaging


Chest X-ray: Report Reviewed, Image Reviewed (improving left lung consolidation)





Problem List





- Problems


(1) ETOH abuse


Code(s): F10.10 - ALCOHOL ABUSE, UNCOMPLICATED





(2) Head trauma


Code(s): S09.90XA - UNSPECIFIED INJURY OF HEAD, INITIAL ENCOUNTER   





(3) Hypokalemia


Code(s): E87.6 - HYPOKALEMIA





(4) Hyponatremia


Code(s): E87.1 - HYPO-OSMOLALITY AND HYPONATREMIA





(5) Left upper lobe pneumonia


Code(s): J18.1 - LOBAR PNEUMONIA, UNSPECIFIED ORGANISM   





(6) Sepsis associated hypotension


Code(s): A41.9 - SEPSIS, UNSPECIFIED ORGANISM





(7) Alcohol dependence with uncomplicated withdrawal


Code(s): F10.230 - ALCOHOL DEPENDENCE WITH WITHDRAWAL, UNCOMPLICATED





(8) Hand abrasion


Code(s): S60.519A - ABRASION OF UNSPECIFIED HAND, INITIAL ENCOUNTER   Qualifiers

: 


     Laterality: right





(9) Alcoholic liver disease


Code(s): K70.9 - ALCOHOLIC LIVER DISEASE, UNSPECIFIED





(10) DM Diabetes mellitus type 2


Code(s): E11.9 - TYPE 2 DIABETES MELLITUS WITHOUT COMPLICATIONS





(11) Acute hypoxemic respiratory failure


Code(s): J96.01 - ACUTE RESPIRATORY FAILURE WITH HYPOXIA








Assessment/Plan


IMP ACUTE HYPOXEMIC RESPIRATORY FAILURE IMPROVING


      SEPSIS


      PNEUMONIA  CAP,?ASPIRATION improving


      HYPONATREMIA  


      ETOH


      ELEVATED LFTS


      S/P FALL


      THROMBOCYTOPENIA





PLAN ANTIBIOTICS AS PER ID


        INHALED BRONCHODILATORS


         STEROID TAPER


         MONITORS LYTES/NA,LFTS


         ANTITUSSIVES


         


         


        


         





DR REYEZ


      


      





 Problem List 





- Problems


(1) ETOH abuse


Code(s): F10.10 - ALCOHOL ABUSE, UNCOMPLICATED





(2) Head trauma


Code(s): S09.90XA - UNSPECIFIED INJURY OF HEAD, INITIAL ENCOUNTER   





(3) Hypokalemia


Code(s): E87.6 - HYPOKALEMIA





(4) Hyponatremia


Code(s): E87.1 - HYPO-OSMOLALITY AND HYPONATREMIA





(5) Left upper lobe pneumonia


Code(s): J18.1 - LOBAR PNEUMONIA, UNSPECIFIED ORGANISM   





(6) Sepsis associated hypotension


Code(s): A41.9 - SEPSIS, UNSPECIFIED ORGANISM





(7) Alcohol dependence with uncomplicated withdrawal


Code(s): F10.230 - ALCOHOL DEPENDENCE WITH WITHDRAWAL, UNCOMPLICATED





(8) Hand abrasion


Code(s): S60.519A - ABRASION OF UNSPECIFIED HAND, INITIAL ENCOUNTER   Qualifiers

: 


     Laterality: right





(9) Alcoholic liver disease


Code(s): K70.9 - ALCOHOLIC LIVER DISEASE, UNSPECIFIED





(10) DM Diabetes mellitus type 2


Code(s): E11.9 - TYPE 2 DIABETES MELLITUS WITHOUT COMPLICATIONS





(11) Acute hypoxemic respiratory failure


Code(s): J96.01 - ACUTE RESPIRATORY FAILURE WITH HYPOXIA

## 2017-07-18 RX ADMIN — CEFEPIME HYDROCHLORIDE SCH GM: 1 INJECTION, POWDER, FOR SOLUTION INTRAMUSCULAR; INTRAVENOUS at 08:31

## 2017-07-18 RX ADMIN — FOLIC ACID SCH MG: 1 TABLET ORAL at 09:19

## 2017-07-18 RX ADMIN — CEFEPIME HYDROCHLORIDE SCH: 1 INJECTION, POWDER, FOR SOLUTION INTRAMUSCULAR; INTRAVENOUS at 18:34

## 2017-07-18 RX ADMIN — PREDNISONE SCH MG: 20 TABLET ORAL at 09:18

## 2017-07-18 RX ADMIN — MULTIVITAMIN TABLET SCH TAB: TABLET at 09:18

## 2017-07-18 RX ADMIN — Medication SCH MG: at 22:33

## 2017-07-18 RX ADMIN — INSULIN ASPART SCH UNITS: 100 INJECTION, SOLUTION INTRAVENOUS; SUBCUTANEOUS at 16:58

## 2017-07-18 RX ADMIN — AZITHROMYCIN DIHYDRATE SCH MG: 500 INJECTION, POWDER, LYOPHILIZED, FOR SOLUTION INTRAVENOUS at 09:18

## 2017-07-18 RX ADMIN — CEFEPIME HYDROCHLORIDE SCH GM: 1 INJECTION, POWDER, FOR SOLUTION INTRAMUSCULAR; INTRAVENOUS at 22:39

## 2017-07-18 RX ADMIN — CEFEPIME HYDROCHLORIDE SCH GM: 1 INJECTION, POWDER, FOR SOLUTION INTRAMUSCULAR; INTRAVENOUS at 02:40

## 2017-07-18 RX ADMIN — POTASSIUM CHLORIDE SCH MEQ: 1500 TABLET, EXTENDED RELEASE ORAL at 09:16

## 2017-07-18 RX ADMIN — Medication SCH MG: at 09:18

## 2017-07-18 RX ADMIN — IBUPROFEN PRN MG: 400 TABLET, FILM COATED ORAL at 13:24

## 2017-07-18 RX ADMIN — INSULIN ASPART SCH UNITS: 100 INJECTION, SOLUTION INTRAVENOUS; SUBCUTANEOUS at 06:35

## 2017-07-18 RX ADMIN — GUAIFENESIN AND CODEINE PHOSPHATE PRN ML: 10; 100 LIQUID ORAL at 13:24

## 2017-07-18 NOTE — PN
Progress Note, Physician


History of Present Illness: 


had mild fever this am





- Current Medication List


Current Medications: 


Active Medications





Azithromycin (Zithromax 500mg Ivpb (Pre-Docked))  500 mg IVPB DAILY [facility]


   Last Admin: 07/18/17 09:18 Dose:  500 mg


Folic Acid (Folic Acid -)  1 mg PO DAILY [facility]


   Last Admin: 07/18/17 09:19 Dose:  1 mg


Guaifenesin/Codeine Phosphate (Robitussin Ac -)  10 ml PO Q8H PRN


   PRN Reason: COUGH


   Last Admin: 07/18/17 13:24 Dose:  10 ml


Ibuprofen (Motrin -)  400 mg PO Q6H PRN


   PRN Reason: PAIN


   Last Admin: 07/18/17 13:24 Dose:  400 mg


Insulin Aspart (Novolog Vial Sliding Scale -)  1 vial SQ BIDAC [facility]


   PRN Reason: Protocol


   Last Admin: 07/18/17 16:58 Dose:  10 units


Multivitamins/Minerals/Vitamin C (Tab-A-Vit -)  1 tab PO DAILY [facility]


   Last Admin: 07/18/17 09:18 Dose:  1 tab


Potassium Chloride (K-Dur -)  40 meq PO DAILY [facility]


   Last Admin: 07/18/17 09:16 Dose:  40 meq


Prednisone (Deltasone -)  40 mg PO DAILY [facility]


   Last Admin: 07/18/17 09:18 Dose:  40 mg


Thiamine HCl (Vitamin B1 -)  100 mg PO BID [facility]


   Last Admin: 07/18/17 09:18 Dose:  100 mg











- Objective


Vital Signs: 


 Vital Signs











Temperature  99.3 F   07/18/17 15:32


 


Pulse Rate  76   07/18/17 15:32


 


Respiratory Rate  18   07/18/17 15:32


 


Blood Pressure  103/67   07/18/17 15:32


 


O2 Sat by Pulse Oximetry (%)  93 L  07/18/17 10:51











Constitutional: Yes: No Distress


HENT: Yes: Atraumatic


Neck: Yes: Supple


Cardiovascular: Yes: Regular Rate and Rhythm


Respiratory: Yes: CTA Bilaterally


Gastrointestinal: Yes: Normal Bowel Sounds


Extremities: Yes: WNL


Neurological: Yes: Alert, Oriented


Labs: 


 CBC, BMP





 07/15/17 11:45 





 07/15/17 11:45 





 INR, PTT











INR  1.49  (0.82-1.09)  H  07/13/17  08:05    














Problem List





- Problems


(1) ETOH abuse


Assessment/Plan: 


 stable


ON FOLIC ACID AND THIAMINE


Code(s): F10.10 - ALCOHOL ABUSE, UNCOMPLICATED





(2) Hypokalemia


Assessment/Plan: 


RESOLVED





Code(s): E87.6 - HYPOKALEMIA





(3) Hyponatremia


Assessment/Plan: 


RESOLVING


Code(s): E87.1 - HYPO-OSMOLALITY AND HYPONATREMIA





(4) Alcohol dependence with uncomplicated withdrawal


Assessment/Plan: 


STABLE


Code(s): F10.230 - ALCOHOL DEPENDENCE WITH WITHDRAWAL, UNCOMPLICATED





(5) Alcoholic liver disease


Code(s): K70.9 - ALCOHOLIC LIVER DISEASE, UNSPECIFIED





(6) DM Diabetes mellitus type 2


Assessment/Plan: 


ON INSULIN


WILL START METFORMIN


Code(s): E11.9 - TYPE 2 DIABETES MELLITUS WITHOUT COMPLICATIONS





(7) HTN (hypertension)


Assessment/Plan: 


bp MEDS STILL ON HOLD


Code(s): I10 - ESSENTIAL (PRIMARY) HYPERTENSION   





(8) Left upper lobe pneumonia


Code(s): J18.1 - LOBAR PNEUMONIA, UNSPECIFIED ORGANISM   








Assessment/Plan


DC IN AM IF STABLE

## 2017-07-18 NOTE — PN
Progress Note, Physician


History of Present Illness: 


PULMONARY





ALERT,NAD,+COUGH,-SOB





- Current Medication List


Current Medications: 


Active Medications





Azithromycin (Zithromax 500mg Ivpb (Pre-Docked))  [facility] mg IVPB [facility]


   Last Admin: 07/18/17 09:18 Dose:  500 mg


Cefepime HCl (Maxipime 1gm Ivpb Pre-Docked)  1 gm IVPB Q8H [facility]


   PRN Reason: Protocol


   Last Admin: 07/18/17 08:31 Dose:  1 gm


Folic Acid (Folic Acid -)  1 mg PO DAILY [facility]


   Last Admin: 07/18/17 09:19 Dose:  1 mg


Guaifenesin/Codeine Phosphate (Robitussin Ac -)  10 ml PO Q8H PRN


   PRN Reason: COUGH


   Last Admin: 07/17/17 22:42 Dose:  10 ml


Ibuprofen (Motrin -)  400 mg PO Q6H PRN


   PRN Reason: PAIN


   Last Admin: 07/13/17 05:48 Dose:  400 mg


Insulin Aspart (Novolog Vial Sliding Scale -)  1 vial SQ BIDAC [facility]


   PRN Reason: Protocol


   Last Admin: 07/18/17 06:35 Dose:  4 units


Multivitamins/Minerals/Vitamin C (Tab-A-Vit -)  1 tab PO [facility]ILY [facility]


   Last Admin: 07/18/17 09:18 Dose:  1 tab


Potassium Chloride (K-Dur -)  40 meq PO [facility]ILY [facility]


   Last Admin: 07/18/17 09:16 Dose:  40 meq


Prednisone (Deltasone -)  40 mg PO DAILY [facility]


   Last Admin: 07/18/17 09:18 Dose:  40 mg


Thiamine HCl (Vitamin B1 -)  100 mg PO BID [facility]


   Last Admin: 07/18/17 09:18 Dose:  100 mg











- Objective


Vital Signs: 


 Vital Signs











Temperature  99.7 F H  07/18/17 11:39


 


Pulse Rate  85   07/18/17 11:39


 


Respiratory Rate  18   07/18/17 11:39


 


Blood Pressure  100/62   07/18/17 11:39


 


O2 Sat by Pulse Oximetry (%)  93 L  07/18/17 10:51











Constitutional: Yes: Well Nourished, Calm


Eyes: Yes: WNL


HENT: Yes: WNL


Neck: Yes: WNL


Cardiovascular: Yes: Regular Rate and Rhythm, S1, S2


Respiratory: Yes: Rales (FEW CRACKLES ON LEFT)


Gastrointestinal: Yes: Normal Bowel Sounds, Soft


Extremities: Yes: WNL


Edema: No


Labs: 


 CBC, BMP





 07/15/17 11:45 





 07/15/17 11:45 





 INR, PTT











INR  1.49  (0.82-1.09)  H  07/13/17  08:05    














Problem List





- Problems


(1) ETOH abuse


Code(s): F10.10 - ALCOHOL ABUSE, UNCOMPLICATED





(2) Head trauma


Code(s): S09.90XA - UNSPECIFIED INJURY OF HEAD, INITIAL ENCOUNTER   





(3) Hypokalemia


Code(s): E87.6 - HYPOKALEMIA





(4) Hyponatremia


Code(s): E87.1 - HYPO-OSMOLALITY AND HYPONATREMIA





(5) Left upper lobe pneumonia


Code(s): J18.1 - LOBAR PNEUMONIA, UNSPECIFIED ORGANISM   





(6) Sepsis associated hypotension


Code(s): A41.9 - SEPSIS, UNSPECIFIED ORGANISM





(7) Alcohol dependence with uncomplicated withdrawal


Code(s): F10.230 - ALCOHOL DEPENDENCE WITH WITHDRAWAL, UNCOMPLICATED





(8) Hand abrasion


Code(s): S60.519A - ABRASION OF UNSPECIFIED HAND, INITIAL ENCOUNTER   Qualifiers

: 


     Laterality: right





(9) Alcoholic liver disease


Code(s): K70.9 - ALCOHOLIC LIVER DISEASE, UNSPECIFIED





(10) DM Diabetes mellitus type 2


Code(s): E11.9 - TYPE 2 DIABETES MELLITUS WITHOUT COMPLICATIONS





(11) Acute hypoxemic respiratory failure


Code(s): J96.01 - ACUTE RESPIRATORY FAILURE WITH HYPOXIA








Assessment/Plan


IMP ACUTE HYPOXEMIC RESPIRATORY FAILURE IMPROVING


      SEPSIS


      PNEUMONIA  CAP,?ASPIRATION improving


      HYPONATREMIA  


      ETOH


      ELEVATED LFTS


      S/P FALL


      THROMBOCYTOPENIA





PLAN  CONSIDER PO ANTIBIOTICS


        INHALED BRONCHODILATORS


         STEROID TAPER


         MONITORS LYTES/NA,LFTS


         ANTITUSSIVES


         


         


        


         





DR REYEZ


      


      





 Problem List 





- Problems


(1) ETOH abuse


Code(s): F10.10 - ALCOHOL ABUSE, UNCOMPLICATED





(2) Head trauma


Code(s): S09.90XA - UNSPECIFIED INJURY OF HEAD, INITIAL ENCOUNTER   





(3) Hypokalemia


Code(s): E87.6 - HYPOKALEMIA





(4) Hyponatremia


Code(s): E87.1 - HYPO-OSMOLALITY AND HYPONATREMIA





(5) Left upper lobe pneumonia


Code(s): J18.1 - LOBAR PNEUMONIA, UNSPECIFIED ORGANISM   





(6) Sepsis associated hypotension


Code(s): A41.9 - SEPSIS, UNSPECIFIED ORGANISM





(7) Alcohol dependence with uncomplicated withdrawal


Code(s): F10.230 - ALCOHOL DEPENDENCE WITH WITHDRAWAL, UNCOMPLICATED





(8) Hand abrasion


Code(s): S60.519A - ABRASION OF UNSPECIFIED HAND, INITIAL ENCOUNTER   Qualifiers

: 


     Laterality: right





(9) Alcoholic liver disease


Code(s): K70.9 - ALCOHOLIC LIVER DISEASE, UNSPECIFIED





(10) DM Diabetes mellitus type 2


Code(s): E11.9 - TYPE 2 DIABETES MELLITUS WITHOUT COMPLICATIONS





(11) Acute hypoxemic respiratory failure


Code(s): J96.01 - ACUTE RESPIRATORY FAILURE WITH HYPOXIA

## 2017-07-18 NOTE — PN
Progress Note, Physician


History of Present Illness: 


doing much better


just says he is weak





- Current Medication List


Current Medications: 


Active Medications





Azithromycin (Zithromax 500mg Ivpb (Pre-Docked))  500 mg IVPB DAILY [facility]


   Last Admin: 07/18/17 09:18 Dose:  500 mg


Cefepime HCl (Maxipime 1gm Ivpb Pre-Docked)  1 gm IVPB Q8H [facility]


   PRN Reason: Protocol


   Last Admin: 07/18/17 08:31 Dose:  1 gm


Folic Acid (Folic Acid -)  1 mg PO DAILY [facility]


   Last Admin: 07/18/17 09:19 Dose:  1 mg


Guaifenesin/Codeine Phosphate (Robitussin Ac -)  10 ml PO Q8H PRN


   PRN Reason: COUGH


   Last Admin: 07/18/17 13:24 Dose:  10 ml


Ibuprofen (Motrin -)  400 mg PO Q6H PRN


   PRN Reason: PAIN


   Last Admin: 07/18/17 13:24 Dose:  400 mg


Insulin Aspart (Novolog Vial Sliding Scale -)  1 vial SQ BIDAC [facility]


   PRN Reason: Protocol


   Last Admin: 07/18/17 06:35 Dose:  4 units


Multivitamins/Minerals/Vitamin C (Tab-A-Vit -)  1 tab PO DAILY [facility]


   Last Admin: 07/18/17 09:18 Dose:  1 tab


Potassium Chloride (K-Dur -)  40 meq PO DAILY [facility]


   Last Admin: 07/18/17 09:16 Dose:  40 meq


Prednisone (Deltasone -)  40 mg PO DAILY [facility]


   Last Admin: 07/18/17 09:18 Dose:  40 mg


Thiamine HCl (Vitamin B1 -)  100 mg PO BID [facility]


   Last Admin: 07/18/17 09:18 Dose:  100 mg











- Objective


Vital Signs: 


 Vital Signs











Temperature  99.7 F H  07/18/17 11:39


 


Pulse Rate  85   07/18/17 11:39


 


Respiratory Rate  18   07/18/17 11:39


 


Blood Pressure  100/62   07/18/17 11:39


 


O2 Sat by Pulse Oximetry (%)  93 L  07/18/17 10:51











Constitutional: Yes: No Distress, Calm


HENT: Yes: Atraumatic


Neck: Yes: Supple, Trachea Midline


Cardiovascular: Yes: Regular Rate and Rhythm


Respiratory: Yes: Poor Air Entry, Rhonchi


Gastrointestinal: Yes: Normal Bowel Sounds, Soft


Musculoskeletal: Yes: WNL


Extremities: Yes: WNL


Wound/Incision: Yes: Clean/Dry


Neurological: Yes: Alert, Oriented


Psychiatric: Yes: Alert, Oriented


Labs: 


 CBC, BMP





 07/15/17 11:45 





 07/15/17 11:45 





 INR, PTT











INR  1.49  (0.82-1.09)  H  07/13/17  08:05    














Assessment/Plan


 Problem List 





Problem List





- Problems


(1) ETOH abuse


Code(s): F10.10 - ALCOHOL ABUSE, UNCOMPLICATED





(2) Head trauma


Code(s): S09.90XA - UNSPECIFIED INJURY OF HEAD, INITIAL ENCOUNTER   





(3) Hypokalemia


Code(s): E87.6 - HYPOKALEMIA





(4) Hyponatremia


Code(s): E87.1 - HYPO-OSMOLALITY AND HYPONATREMIA





(5) Left upper lobe pneumonia


Code(s): J18.1 - LOBAR PNEUMONIA, UNSPECIFIED ORGANISM   





(6) Sepsis associated hypotension


Code(s): A41.9 - SEPSIS, UNSPECIFIED ORGANISM





(7) Alcohol dependence with uncomplicated withdrawal


Code(s): F10.230 - ALCOHOL DEPENDENCE WITH WITHDRAWAL, UNCOMPLICATED





(8) Hand abrasion


Code(s): S60.519A - ABRASION OF UNSPECIFIED HAND, INITIAL ENCOUNTER   Qualifiers

: 


     Laterality: right





(9) Alcoholic liver disease


Code(s): K70.9 - ALCOHOLIC LIVER DISEASE, UNSPECIFIED





(10) DM Diabetes mellitus type 2


Code(s): E11.9 - TYPE 2 DIABETES MELLITUS WITHOUT COMPLICATIONS





(11) Acute hypoxemic respiratory failure


Code(s): J96.01 - ACUTE RESPIRATORY FAILURE WITH HYPOXIA








plan


 switch to oral abx tomorrow


can give him augmentin 875 mg twice a day for another 5 days more


incentive sharif

## 2017-07-19 VITALS — TEMPERATURE: 96.1 F | DIASTOLIC BLOOD PRESSURE: 58 MMHG | HEART RATE: 89 BPM | SYSTOLIC BLOOD PRESSURE: 96 MMHG

## 2017-07-19 RX ADMIN — CEFEPIME HYDROCHLORIDE SCH GM: 1 INJECTION, POWDER, FOR SOLUTION INTRAMUSCULAR; INTRAVENOUS at 02:46

## 2017-07-19 RX ADMIN — AZITHROMYCIN DIHYDRATE SCH MG: 500 INJECTION, POWDER, LYOPHILIZED, FOR SOLUTION INTRAVENOUS at 09:18

## 2017-07-19 RX ADMIN — INSULIN ASPART SCH UNITS: 100 INJECTION, SOLUTION INTRAVENOUS; SUBCUTANEOUS at 06:35

## 2017-07-19 RX ADMIN — PREDNISONE SCH MG: 20 TABLET ORAL at 09:18

## 2017-07-19 RX ADMIN — FOLIC ACID SCH MG: 1 TABLET ORAL at 09:18

## 2017-07-19 RX ADMIN — IBUPROFEN PRN MG: 400 TABLET, FILM COATED ORAL at 10:41

## 2017-07-19 RX ADMIN — Medication SCH MG: at 09:18

## 2017-07-19 RX ADMIN — CEFEPIME HYDROCHLORIDE SCH GM: 1 INJECTION, POWDER, FOR SOLUTION INTRAMUSCULAR; INTRAVENOUS at 09:18

## 2017-07-19 RX ADMIN — POTASSIUM CHLORIDE SCH MEQ: 1500 TABLET, EXTENDED RELEASE ORAL at 09:18

## 2017-07-19 RX ADMIN — MULTIVITAMIN TABLET SCH TAB: TABLET at 09:18

## 2017-07-19 NOTE — DS
Physical Examination


Vital Signs: 


 Vital Signs











Temperature  96.1 F L  07/19/17 10:00


 


Pulse Rate  89   07/19/17 10:00


 


Respiratory Rate  18   07/19/17 10:00


 


Blood Pressure  96/58   07/19/17 10:00


 


O2 Sat by Pulse Oximetry (%)  93 L  07/18/17 21:00











Labs: 


 CBC, BMP





 07/15/17 11:45 





 07/15/17 11:45 











Discharge Summary


Reason For Visit: ALCOHOL ABUSE,HYPONATREMIA,HYPOKALEMIA





- Instructions


Diet, Activity, Other Instructions: 


see your doctor for blood pressure medicine..when to start?


Referrals: 


Ross Parks [Primary Care Provider] - 


Disposition: HOME





- Home Medications


Comprehensive Discharge Medication List: 


Ambulatory Orders





Metformin HCl [Glucophage -] 1,000 mg PO BID@0700,1630 #60 tablet 05/22/17 


Albuterol 0.083% Nebulizer Sol [Ventolin 0.083% Nebulizer Soln -] 1 amp NEB Q4H 

PRN #7 amp 07/17/17 


Amoxicillin/Potassium Clav [Augmentin 875-125 Tablet] 1 each PO BID #14 tablet 

07/17/17 


Folic Acid - 1 mg PO DAILY #30 tablet 07/17/17 


Guaifenesin AC [Robitussin AC -] 10 ml PO Q8H PRN #90 cap MDD 2 07/17/17 


Multivitamins [Multivit ([facility])] 1 tab PO DAILY #0 tab 07/17/17 


Prednisone 10 mg PO DAILY #30 tablet 07/17/17 


Thiamine HCl [Vitamin B1 -] 100 mg PO BID #60 tablet 07/17/17 





[facility] clinic at [facility]

## 2019-02-19 ENCOUNTER — HOSPITAL ENCOUNTER (INPATIENT)
Dept: HOSPITAL 74 - YASAS | Age: 54
LOS: 4 days | Discharge: HOME | End: 2019-02-23
Attending: SURGERY | Admitting: SURGERY
Payer: COMMERCIAL

## 2019-02-19 VITALS — BODY MASS INDEX: 32.5 KG/M2

## 2019-02-19 DIAGNOSIS — I10: ICD-10-CM

## 2019-02-19 DIAGNOSIS — Z79.84: ICD-10-CM

## 2019-02-19 DIAGNOSIS — F10.230: Primary | ICD-10-CM

## 2019-02-19 DIAGNOSIS — E11.9: ICD-10-CM

## 2019-02-19 PROCEDURE — HZ2ZZZZ DETOXIFICATION SERVICES FOR SUBSTANCE ABUSE TREATMENT: ICD-10-PCS | Performed by: SURGERY

## 2019-02-19 RX ADMIN — Medication SCH MG: at 22:11

## 2019-02-19 RX ADMIN — METFORMIN HYDROCHLORIDE SCH MG: 500 TABLET ORAL at 17:00

## 2019-02-19 RX ADMIN — ENALAPRIL MALEATE SCH MG: 5 TABLET ORAL at 14:24

## 2019-02-19 NOTE — HP
CIWA Score


Nausea/Vomiting: 3


Muscle Tremors: 3


Anxiety: 2


Agitation: 2


Paroxysmal Sweats: 1-Minimal Palms Moist


Orientation: 0-Oriented


Tacttile Disturbances: 1-Very Mild Itch/Numbness


Auditory Disturbances: 1-Very Mild


Visual Disturbances: 0-None


Headache: 2-Mild


CIWA-Ar Total Score: 15





- Admission Criteria


[facility] Guidelines: Admission for Medically Managed Detox: 


Requires at least one of the followin. CIWA greater than 12


2. Seizures within the past 24 hours


3. Delirium tremens within the past 24 hours


4. Hallucinations within the past 24 hours


5. Acute intervention needed for co  occurring medical disorder


6. Acute intervention needed for co  occurring psychiatric disorder


7. Severe withdrawal that cannot be handled at a lower level of care (continued


    vomiting, continued diarrhea, abnormal vital signs) requiring intravenous


    medication and/or fluids


8. Pregnancy





Patient presents the following: CIWA greater than 12


Admission Criteria Met: Admission criteria met





Admission ROS [facility]





- HPI


Chief Complaint: 





i need help to stop drinking alcohol


Allergies/Adverse Reactions: 


 Allergies











Allergy/AdvReac Type Severity Reaction Status Date / Time


 


No Known Allergies Allergy   Verified 19 11:27











History of Present Illness: 





this 53 years old male with alcohol dependence,seeking detox,withdrawal symptom,

last treatment  rehab 17 to 17


history of hypertension,type 2 dm


fx of left bid toe and second toe left 6 months ago


low back pain


longest period of sobriety 1 year


plan to go to shelter,out patient program 





Exam Limitations: No Limitations





- Ebola screening


Have you traveled outside of the country in the last 21 days: No


Have you had contact with anyone from an Ebola affected area: No


Have you been sick,other than usual withdrawal symptoms: No


Do you have a fever: No





- Review of Systems


Constitutional: Loss of Appetite, Malaise, Night Sweats, Changes in sleep, 

Weakness


EENT: reports: Nose Congestion


Respiratory: reports: No Symptoms reported


Cardiac: reports: No Symptoms Reported


GI: reports: Nausea, Vomiting, Abdominal cramping


: reports: No Symptoms Reported


Musculoskeletal: reports: Back Pain, Muscle Pain


Integumentary: reports: Dryness


Neuro: reports: Headache, Tremors


Endocrine: reports: No Symptoms Reported


Hematology: reports: No Symptoms Reported


Psychiatric: reports: No Sypmtoms Reported, Judgement Intact, Mood/Affect 

Appropiate


Other Systems: Reviewed and Negative





Patient History





- Patient Medical History


Hx Anemia: No


Hx Asthma: No


Hx Chronic Obstructive Pulmonary Disease (COPD): No


Hx Cancer: No


Hx Cardiac Disorders: No


Hx Congestive Heart Failure: No


Hx Hypertension: No


Hx Hypercholesterolemia: No


Hx Pacemaker: No


HX Cerebrovascular Accident: No


Hx Seizures: No


Hx Dementia: No


Hx Diabetes: Yes (type 2 dm)


Hx Gastrointestinal Disorders: No


Hx Liver Disease: No


Hx Genitourinary Disorders: No


Hx Sexually Transmitted Disorders: No


Hx Renal Disease (ESRD): No


Hx Thyroid Disease: No


Hx Human Immunodeficiency Virus (HIV): No (- )


Hx Hepatitis C: No


Hx Depression: Yes


Hx Suicide Attempt: No


Hx Bipolar Disorder: No


Hx Schizophrenia: No


Other Medical History: no suicidal,no homicidal





- Patient Surgical History


Past Surgical History: No


Hx Neurologic Surgery: No


Hx Cataract Extraction: No


Hx Cardiac Surgery: No


Hx Lung Surgery: No


Hx Breast Surgery: No


Hx Breast Biopsy: No


Hx Abdominal Surgery: No


Hx Appendectomy: No


Hx Cholecystectomy: No


Hx Genitourinary Surgery: No


Hx  Section: No


Hx Orthopedic Surgery: No


Hx Hysterectomy: No


Anesthesia Reaction: No





- PPD History


Previous Implant?: Yes


Documented Results: Negative w/o proof


Implanted On Prior R Admission?: Yes


Date: 17


Results: 0 MM


PPD to be Administered?: Yes





- Smoking Cessation


Smoking history: Never smoked


Have you smoked in the past 12 months: No


Aproximately how many cigarettes per day: 0


Cigars Per Day: 0


Hx Chewing Tobacco Use: No





- Substance & Tx. History


Hx Alcohol Use: Yes


Hx Substance Use: No


Substance Use Type: Alcohol


Hx Substance Use Treatment: Yes (rehab 17 to 16 rehab)





- Substances Abused


  ** Alcohol


Route: Oral


Frequency: Daily


Amount used: 3 22 oz beers


Age of first use: 15


Date of Last Use: 18





Family Disease History





- Family Disease History


Family Disease History: Other: Father (ETOH DEPENDENCE )





Admission Physical Exam [facility]





- Vital Signs


Vital Signs: 


 Vital Signs - 24 hr











  19





  10:05


 


Temperature 97.9 F


 


Pulse Rate 93 H


 


Respiratory 20





Rate 


 


Blood Pressure 135/74














- Physical


General Appearance: Yes: Moderate Distress, Tremorous, Irritable, Sweating, 

Anxious


HEENTM: Yes: Normal ENT Inspection, JUD, Pharynx Normal


Respiratory: Yes: Lungs Clear, Normal Breath Sounds, No Respiratory Distress


Neck: Yes: Within Normal Limits, Supple, Trachea in good position


Breast: Yes: Within Normal Limits


Cardiology: Yes: Within Normal Limits, Regular Rhythm, S1, S2


Abdominal: Yes: Within Normal Limits, Normal Bowel Sounds, Non Tender, Soft


Genitourinary: Yes: Within Normal Limits


Back: Yes: Muscle Spasm


Musculoskeletal: Yes: Back pain, Muscle Pain


Extremities: Yes: Normal Range of Motion, Tremors


Neurological: Yes: CNs II-XII NML intact, Fully Oriented, Alert, Motor Strength 

5/5


Integumentary: Yes: Dry





- Diagnostic


(1) Alcohol dependence with uncomplicated withdrawal


Current Visit: No   Status: Acute   





(2) HTN (hypertension)


Current Visit: No   Status: Chronic   





(3) DM2 (diabetes mellitus, type 2)


Current Visit: Yes   Status: Acute   





(4) Toes fractured


Current Visit: Yes   Status: Acute   





Cleared for Admission [facility]





- Detox or Rehab


[facility] Level of Care: Medically Managed


Detox Regimen/Protocol: Librium





[facility] Breath Alcohol Content


Breath Alcohol Content: 0.134





Urine Drug Screen





- Results


Drug Screen Negative: No


Urine Drug Screen Results: THC-Marijuana





Inpatient Rehab Admission





- Rehab Decision to Admit


Inpatient rehab admission?: No

## 2019-02-20 LAB
ALBUMIN SERPL-MCNC: 2.4 G/DL (ref 3.4–5)
ALP SERPL-CCNC: 578 U/L (ref 45–117)
ALT SERPL-CCNC: 49 U/L (ref 13–61)
ANION GAP SERPL CALC-SCNC: 5 MMOL/L (ref 8–16)
AST SERPL-CCNC: 90 U/L (ref 15–37)
BILIRUB SERPL-MCNC: 1 MG/DL (ref 0.2–1)
BUN SERPL-MCNC: 7 MG/DL (ref 7–18)
CALCIUM SERPL-MCNC: 7.8 MG/DL (ref 8.5–10.1)
CHLORIDE SERPL-SCNC: 104 MMOL/L (ref 98–107)
CO2 SERPL-SCNC: 27 MMOL/L (ref 21–32)
CREAT SERPL-MCNC: 0.6 MG/DL (ref 0.55–1.3)
DEPRECATED RDW RBC AUTO: 15.6 % (ref 11.9–15.9)
GLUCOSE SERPL-MCNC: 278 MG/DL (ref 74–106)
HCT VFR BLD CALC: 38 % (ref 35.4–49)
HGB BLD-MCNC: 12.9 GM/DL (ref 11.7–16.9)
MCH RBC QN AUTO: 31.5 PG (ref 25.7–33.7)
MCHC RBC AUTO-ENTMCNC: 33.9 G/DL (ref 32–35.9)
MCV RBC: 93 FL (ref 80–96)
PLATELET # BLD AUTO: 79 K/MM3 (ref 134–434)
PMV BLD: 11.4 FL (ref 7.5–11.1)
POTASSIUM SERPLBLD-SCNC: 3.6 MMOL/L (ref 3.5–5.1)
PROT SERPL-MCNC: 7.9 G/DL (ref 6.4–8.2)
RBC # BLD AUTO: 4.09 M/MM3 (ref 4–5.6)
SODIUM SERPL-SCNC: 136 MMOL/L (ref 136–145)
WBC # BLD AUTO: 9.4 K/MM3 (ref 4–10)

## 2019-02-20 RX ADMIN — VITAMIN D, TAB 1000IU (100/BT) SCH UNIT: 25 TAB at 10:23

## 2019-02-20 RX ADMIN — METFORMIN HYDROCHLORIDE SCH MG: 500 TABLET ORAL at 18:02

## 2019-02-20 RX ADMIN — Medication SCH TAB: at 10:23

## 2019-02-20 RX ADMIN — Medication SCH MG: at 22:26

## 2019-02-20 RX ADMIN — METFORMIN HYDROCHLORIDE SCH MG: 500 TABLET ORAL at 06:13

## 2019-02-20 RX ADMIN — ENALAPRIL MALEATE SCH MG: 5 TABLET ORAL at 10:23

## 2019-02-20 NOTE — PN
S CIWA





- CIWA Score


Nausea/Vomitin-No Nausea/No Vomiting


Muscle Tremors: 3


Anxiety: 3


Agitation: 3


Paroxysmal Sweats: 3


Orientation: 0-Oriented


Tacttile Disturbances: 0-None


Auditory Disturbances: 0-None


Visual Disturbances: 0-None


Headache: 0-None Present


CIWA-Ar Total Score: 12





B[facility] Progress Note (SOAP)


Subjective: 





sweats


shakes


interrupted sleep


legs/feet pain


Objective: 





19 11:27


 Vital Signs











Temperature  98.7 F   19 06:27


 


Pulse Rate  97 H  19 06:30


 


Respiratory Rate  19 06:30


 


Blood Pressure  140/84   19 06:27


 


O2 Sat by Pulse Oximetry (%)      








 Laboratory Tests











  19





  11:46 16:27 05:45


 


WBC    9.4


 


RBC    4.09


 


Hgb    12.9


 


Hct    38.0


 


MCV    93.0


 


MCH    31.5


 


MCHC    33.9


 


RDW    15.6


 


Plt Count    79 L D


 


MPV    11.4 H D


 


Sodium   


 


Potassium   


 


Chloride   


 


Carbon Dioxide   


 


Anion Gap   


 


BUN   


 


Creatinine   


 


Creat Clearance w eGFR   


 


POC Glucometer  177  333 


 


Random Glucose   


 


Calcium   


 


Total Bilirubin   


 


AST   


 


ALT   


 


Alkaline Phosphatase   


 


Total Protein   


 


Albumin   


 


RPR Titer   














  19





  05:45 05:45 06:11


 


WBC   


 


RBC   


 


Hgb   


 


Hct   


 


MCV   


 


MCH   


 


MCHC   


 


RDW   


 


Plt Count   


 


MPV   


 


Sodium  136  


 


Potassium  3.6  


 


Chloride  104  


 


Carbon Dioxide  27  


 


Anion Gap  5 L  


 


BUN  7  


 


Creatinine  0.6  


 


Creat Clearance w eGFR  > 60  


 


POC Glucometer    200


 


Random Glucose  278 H  


 


Calcium  7.8 L  


 


Total Bilirubin  1.0  


 


AST  90 H  


 


ALT  49  


 


Alkaline Phosphatase  578 H  


 


Total Protein  7.9  


 


Albumin  2.4 L  


 


RPR Titer   Nonreactive 








aaox3


ambulating


no acute distress


Assessment: 





19 11:28


withdrawal sx


Plan: 





continue detox


increase fluids


motrin/tylenol prn

## 2019-02-21 RX ADMIN — Medication SCH TAB: at 10:31

## 2019-02-21 RX ADMIN — ENALAPRIL MALEATE SCH MG: 5 TABLET ORAL at 10:31

## 2019-02-21 RX ADMIN — BACITRACIN ZINC SCH: 500 OINTMENT TOPICAL at 23:09

## 2019-02-21 RX ADMIN — METFORMIN HYDROCHLORIDE SCH MG: 500 TABLET ORAL at 06:50

## 2019-02-21 RX ADMIN — Medication SCH MG: at 23:09

## 2019-02-21 RX ADMIN — VITAMIN D, TAB 1000IU (100/BT) SCH UNIT: 25 TAB at 10:31

## 2019-02-21 RX ADMIN — METFORMIN HYDROCHLORIDE SCH MG: 500 TABLET ORAL at 16:50

## 2019-02-21 RX ADMIN — BACITRACIN ZINC SCH GM: 500 OINTMENT TOPICAL at 14:52

## 2019-02-21 NOTE — PN
S CIWA





- CIWA Score


Nausea/Vomitin-No Nausea/No Vomiting


Muscle Tremors: 3


Anxiety: 3


Agitation: 3


Paroxysmal Sweats: 2


Orientation: 0-Oriented


Tacttile Disturbances: 0-None


Auditory Disturbances: 0-None


Visual Disturbances: 0-None


Headache: 0-None Present


CIWA-Ar Total Score: 11





B[facility] Progress Note (SOAP)


Subjective: 





sweats


shakes


interrupted sleep


body aches


right hand wound 


Objective: 





19 13:08


 Vital Signs











Temperature  97.2 F L  19 09:27


 


Pulse Rate  82   19 09:27


 


Respiratory Rate  18   19 09:27


 


Blood Pressure  135/70   19 09:27


 


O2 Sat by Pulse Oximetry (%)      








 Laboratory Tests











  19





  11:46 16:27 05:45


 


WBC    9.4


 


RBC    4.09


 


Hgb    12.9


 


Hct    38.0


 


MCV    93.0


 


MCH    31.5


 


MCHC    33.9


 


RDW    15.6


 


Plt Count    79 L D


 


MPV    11.4 H D


 


Sodium   


 


Potassium   


 


Chloride   


 


Carbon Dioxide   


 


Anion Gap   


 


BUN   


 


Creatinine   


 


Creat Clearance w eGFR   


 


POC Glucometer  177  333 


 


Random Glucose   


 


Calcium   


 


Total Bilirubin   


 


AST   


 


ALT   


 


Alkaline Phosphatase   


 


Total Protein   


 


Albumin   


 


RPR Titer   














  19





  05:45 05:45 06:11


 


WBC   


 


RBC   


 


Hgb   


 


Hct   


 


MCV   


 


MCH   


 


MCHC   


 


RDW   


 


Plt Count   


 


MPV   


 


Sodium  136  


 


Potassium  3.6  


 


Chloride  104  


 


Carbon Dioxide  27  


 


Anion Gap  5 L  


 


BUN  7  


 


Creatinine  0.6  


 


Creat Clearance w eGFR  > 60  


 


POC Glucometer    200


 


Random Glucose  278 H  


 


Calcium  7.8 L  


 


Total Bilirubin  1.0  


 


AST  90 H  


 


ALT  49  


 


Alkaline Phosphatase  578 H  


 


Total Protein  7.9  


 


Albumin  2.4 L  


 


RPR Titer   Nonreactive 














  19





  16:35 06:25


 


WBC  


 


RBC  


 


Hgb  


 


Hct  


 


MCV  


 


MCH  


 


MCHC  


 


RDW  


 


Plt Count  


 


MPV  


 


Sodium  


 


Potassium  


 


Chloride  


 


Carbon Dioxide  


 


Anion Gap  


 


BUN  


 


Creatinine  


 


Creat Clearance w eGFR  


 


POC Glucometer  247  125


 


Random Glucose  


 


Calcium  


 


Total Bilirubin  


 


AST  


 


ALT  


 


Alkaline Phosphatase  


 


Total Protein  


 


Albumin  


 


RPR Titer  








aaox3


ambulating


no acute distress


Assessment: 





19 13:08


withdrawal sx


right hand fourth digit noted with sutures intact in the healing process. 

encouraged pt to apply bacitracin and remain covered with gauze applied by RN. 

encouraged pt to try not to extend finger too much so wound can heal. pt in 

agreement. no s/s of infection noted; however bacitracin ointment ordered 

prophylactic to prevent infection.  


Plan: 





continue detox


increase fluids

## 2019-02-21 NOTE — EKG
Test Reason : 

Blood Pressure : ***/*** mmHG

Vent. Rate : 080 BPM     Atrial Rate : 080 BPM

   P-R Int : 172 ms          QRS Dur : 108 ms

    QT Int : 414 ms       P-R-T Axes : 010 032 029 degrees

   QTc Int : 477 ms

 

NORMAL SINUS RHYTHM

NORMAL ECG

WHEN COMPARED WITH ECG OF 10-JUL-2017 17:35,

QT HAS SHORTENED

Confirmed by LATASHA MORALES MD ([id number]) on 2/21/2019 12:00:14 PM

 

Referred By:  SONAM ROQUE MD           Confirmed By:LATASHA MORALES MD

## 2019-02-22 RX ADMIN — BACITRACIN ZINC SCH: 500 OINTMENT TOPICAL at 11:00

## 2019-02-22 RX ADMIN — METFORMIN HYDROCHLORIDE SCH MG: 500 TABLET ORAL at 06:16

## 2019-02-22 RX ADMIN — BACITRACIN ZINC SCH: 500 OINTMENT TOPICAL at 23:18

## 2019-02-22 RX ADMIN — Medication SCH: at 11:00

## 2019-02-22 RX ADMIN — ENALAPRIL MALEATE SCH: 5 TABLET ORAL at 11:00

## 2019-02-22 RX ADMIN — Medication SCH: at 23:18

## 2019-02-22 RX ADMIN — VITAMIN D, TAB 1000IU (100/BT) SCH: 25 TAB at 11:00

## 2019-02-22 RX ADMIN — METFORMIN HYDROCHLORIDE SCH MG: 500 TABLET ORAL at 17:10

## 2019-02-22 NOTE — PN
BHS Progress Note (SOAP)


Subjective: 





sleepy


sweats


Objective: 





02/22/19 10:33


 Vital Signs











Temperature  98.7 F   02/22/19 08:57


 


Pulse Rate  107 H  02/22/19 08:57


 


Respiratory Rate  18   02/22/19 08:57


 


Blood Pressure  109/64   02/22/19 08:57


 


O2 Sat by Pulse Oximetry (%)      








aaox3


ambulating


no acute distress


Assessment: 





02/22/19 10:33


mild withdrawal sx


Plan: 





continue detox


increase fluids


d/c in am

## 2019-02-23 VITALS — SYSTOLIC BLOOD PRESSURE: 107 MMHG | TEMPERATURE: 97.9 F | HEART RATE: 110 BPM | DIASTOLIC BLOOD PRESSURE: 69 MMHG

## 2019-02-23 RX ADMIN — METFORMIN HYDROCHLORIDE SCH MG: 500 TABLET ORAL at 06:54

## 2019-02-23 NOTE — DS
[facility] Detox Discharge Summary


Admission Date: 


02/19/19





Discharge Date: 02/23/19





- History


Present History: Alcohol Dependence


Additional Comments: 





PT COMPLETED DETOX. REPORTS HE HAS A PRIMARY CARE DOCTOR, DR TJ HAMMONDS AT 

[place], [place] FOR MEDICAL MANAGEMENT. PT REPORTS HE 

HAS OWN MEDS IN SECURITY. PT MET WITH COUNSELOR SHAWANDA BEFORE  EXITING.


Pertinent Past History: 





HTN


DM





- Physical Exam Results


Vital Signs: 


 Vital Signs











Temperature  97.9 F   02/23/19 10:11


 


Pulse Rate  110 H  02/23/19 10:11


 


Respiratory Rate  20 02/23/19 10:11


 


Blood Pressure  107/69   02/23/19 10:11


 


O2 Sat by Pulse Oximetry (%)      











Pertinent Admission Physical Exam Findings: 





WITHDRAWAL SX


 Laboratory Results - last 24 hr











  02/22/19 02/23/19





  16:26 06:11


 


POC Glucometer  268  108








 Laboratory Tests











  02/19/19 02/19/19 02/20/19





  11:46 16:27 05:45


 


WBC    9.4


 


RBC    4.09


 


Hgb    12.9


 


Hct    38.0


 


MCV    93.0


 


MCH    31.5


 


MCHC    33.9


 


RDW    15.6


 


Plt Count    79 L D


 


MPV    11.4 H D


 


Sodium   


 


Potassium   


 


Chloride   


 


Carbon Dioxide   


 


Anion Gap   


 


BUN   


 


Creatinine   


 


Creat Clearance w eGFR   


 


POC Glucometer  177  333 


 


Random Glucose   


 


Calcium   


 


Total Bilirubin   


 


AST   


 


ALT   


 


Alkaline Phosphatase   


 


Total Protein   


 


Albumin   


 


RPR Titer   














  02/20/19 02/20/19 02/20/19





  05:45 05:45 06:11


 


WBC   


 


RBC   


 


Hgb   


 


Hct   


 


MCV   


 


MCH   


 


MCHC   


 


RDW   


 


Plt Count   


 


MPV   


 


Sodium  136  


 


Potassium  3.6  


 


Chloride  104  


 


Carbon Dioxide  27  


 


Anion Gap  5 L  


 


BUN  7  


 


Creatinine  0.6  


 


Creat Clearance w eGFR  > 60  


 


POC Glucometer    200


 


Random Glucose  278 H  


 


Calcium  7.8 L  


 


Total Bilirubin  1.0  


 


AST  90 H  


 


ALT  49  


 


Alkaline Phosphatase  578 H  


 


Total Protein  7.9  


 


Albumin  2.4 L  


 


RPR Titer   Nonreactive 














  02/20/19 02/21/19 02/21/19





  16:35 06:25 16:30


 


WBC   


 


RBC   


 


Hgb   


 


Hct   


 


MCV   


 


MCH   


 


MCHC   


 


RDW   


 


Plt Count   


 


MPV   


 


Sodium   


 


Potassium   


 


Chloride   


 


Carbon Dioxide   


 


Anion Gap   


 


BUN   


 


Creatinine   


 


Creat Clearance w eGFR   


 


POC Glucometer  247  125  209


 


Random Glucose   


 


Calcium   


 


Total Bilirubin   


 


AST   


 


ALT   


 


Alkaline Phosphatase   


 


Total Protein   


 


Albumin   


 


RPR Titer   














  02/22/19 02/22/19 02/23/19





  05:23 16:26 06:11


 


WBC   


 


RBC   


 


Hgb   


 


Hct   


 


MCV   


 


MCH   


 


MCHC   


 


RDW   


 


Plt Count   


 


MPV   


 


Sodium   


 


Potassium   


 


Chloride   


 


Carbon Dioxide   


 


Anion Gap   


 


BUN   


 


Creatinine   


 


Creat Clearance w eGFR   


 


POC Glucometer  130  268  108


 


Random Glucose   


 


Calcium   


 


Total Bilirubin   


 


AST   


 


ALT   


 


Alkaline Phosphatase   


 


Total Protein   


 


Albumin   


 


RPR Titer   














- Treatment


Hospital Course: Detox Protocol Followed, Detoxed Safely, Responded well, 

Discharged Condition Good





- Medication


Discharge Medications: 


Ambulatory Orders





metFORMIN HCL [Glucophage -] 1,000 mg PO BID@0700,1630 #60 tablet 05/22/17 


Cholecalciferol (Vitamin D3) [Vitamin D3] 2,000 unit PO DAILY 02/19/19 


Enalapril Maleate [Vasotec -] 5 mg PO DAILY 02/19/19 


Thiamine Mononitrate [Vitamin B-1] 100 mg PO DAILY 02/19/19 











- Diagnosis


(1) Alcohol dependence with uncomplicated withdrawal


Status: Acute   





(2) DM Diabetes mellitus type 2


Status: Chronic   





(3) HTN (hypertension)


Status: Chronic   





- AMA


Did Patient Leave Against Medical Advice: No